# Patient Record
Sex: MALE | Race: BLACK OR AFRICAN AMERICAN | NOT HISPANIC OR LATINO | Employment: UNEMPLOYED | URBAN - METROPOLITAN AREA
[De-identification: names, ages, dates, MRNs, and addresses within clinical notes are randomized per-mention and may not be internally consistent; named-entity substitution may affect disease eponyms.]

---

## 2020-09-02 ENCOUNTER — HOSPITAL ENCOUNTER (EMERGENCY)
Facility: HOSPITAL | Age: 80
Discharge: LEFT BEFORE DISCHARGE | End: 2020-09-02
Attending: EMERGENCY MEDICINE

## 2020-09-02 VITALS
OXYGEN SATURATION: 99 % | TEMPERATURE: 98 F | DIASTOLIC BLOOD PRESSURE: 105 MMHG | HEIGHT: 65 IN | SYSTOLIC BLOOD PRESSURE: 225 MMHG | RESPIRATION RATE: 18 BRPM | HEART RATE: 90 BPM | BODY MASS INDEX: 26.66 KG/M2 | WEIGHT: 160 LBS

## 2020-09-02 DIAGNOSIS — M79.674 PAIN OF TOE OF RIGHT FOOT: ICD-10-CM

## 2020-09-02 DIAGNOSIS — R21 RASH: Primary | ICD-10-CM

## 2020-09-02 PROCEDURE — 99282 EMERGENCY DEPT VISIT SF MDM: CPT

## 2020-09-02 NOTE — ED NOTES
Pt presents to the ED with c/o rash to right leg. Pt states that he noticed the rash a week ago. Pt states that it was bumps at first that burst and he started putting Cortizone 10 on it. Pt states that he also has swelling to his foot after he hit his toe. Pt denies any allergies. Pt states that he does have a history of HTN but has not been taking medication for it since he moved to [place]. Pt states that his BP has been in control. Pt denies chest pain. Pt placed on cardiac monitor. NAD noted.

## 2020-09-02 NOTE — ED PROVIDER NOTES
Encounter Date: 9/2/2020    SCRIBE #1 NOTE: I, Fili Chatterjee, am scribing for, and in the presence of,  Iggy Gonzalez MD. I have scribed the following portions of the note - Other sections scribed: HPI, ROS, PE.       History     Chief Complaint   Patient presents with    Rash     Pt c/o rash to RLE after eating seafood a week ago, pt reports he has been using OTC cream without any relief. Pt also c/o pain and swelling to the great toe of the right foot x1 week. Pt denies any injury or trauma to toe. Resp even and unlabored.     Yefri Butler is a 80 y.o. male with a PMHx of HTN who presents to the ED with complaints of a rash on his right foot. States he has been eating lost of seafood recently, which he believes is the cause of his rash. Patient also endorses pain in left big toe after hitting toe on bed post. Upon exam, I asked him to take of the sock on his left foot, which he refused and decided to leave AMA.       The history is provided by the patient. No  was used.     Review of patient's allergies indicates:  No Known Allergies  Past Medical History:   Diagnosis Date    Hypertension      History reviewed. No pertinent surgical history.  History reviewed. No pertinent family history.  Social History     Tobacco Use    Smoking status: Never Smoker    Smokeless tobacco: Never Used   Substance Use Topics    Alcohol use: Yes     Frequency: Never    Drug use: Never     Review of Systems   Musculoskeletal:        Positive for pain in right great toe.    Skin: Positive for rash (Right foot).       Physical Exam     Initial Vitals [09/02/20 0655]   BP Pulse Resp Temp SpO2   (S) (!) 225/105 90 18 97.8 °F (36.6 °C) 99 %      MAP       --         Physical Exam    Constitutional: He appears well-developed and well-nourished. He is not diaphoretic. No distress.   HENT:   Head: Normocephalic and atraumatic.   Right Ear: External ear normal.   Left Ear: External ear normal.   Eyes: EOM are  normal. Pupils are equal, round, and reactive to light. Right eye exhibits no discharge. Left eye exhibits no discharge.   Neck: Normal range of motion.   Cardiovascular: Normal rate.   Pulmonary/Chest: No respiratory distress.   Abdominal: He exhibits no distension. There is no guarding.   Musculoskeletal: Normal range of motion. No edema.      Comments: Normal right foot exam.   Neurological: He is alert and oriented to person, place, and time. He has normal strength. GCS score is 15. GCS eye subscore is 4. GCS verbal subscore is 5. GCS motor subscore is 6.   Skin: Skin is warm and dry.   Psychiatric: He has a normal mood and affect. His behavior is normal.         ED Course   Procedures  Labs Reviewed - No data to display       Imaging Results    None          Medical Decision Making:   Initial Assessment:   80-year-old male presenting with right toe pain and rash.  History exam severely limited.  Upon entering room, the patient became irate.  I requested to look at his left foot in order to compare his area of reported pain.  The patient refused immediately asked to leave AMA.  He refused to discuss any other aspects of his health including hypertension.  He refused further exam or palpation.  The foot does not appear to be significantly disease.  There appears to be healing rash at the level of his ankle and shin.  No obvious deformity, swelling, cellulitis of the foot.  Patient ambulating without difficulty.  Patient elected to leave against medical advice.  I have no reason to suspect the patient does not have capability to understand the consequences of his actions. He did not appear intoxicated, deranged, or altered. Patient encouraged to return for any new or worsening condition.              Scribe Attestation:   Scribe #1: I performed the above scribed service and the documentation accurately describes the services I performed. I attest to the accuracy of the note.                          Clinical  Impression:       ICD-10-CM ICD-9-CM   1. Rash  R21 782.1   2. Pain of toe of right foot  M79.674 729.5         Disposition:   Disposition: Discharged  Condition: Stable     ED Disposition Condition    AMA            I, Iggy Gonzalez, personally performed the services described in this documentation. All medical record entries made by the scribe were at my direction and in my presence. I have reviewed the chart and agree that the record reflects my personal performance and is accurate and complete.                 Iggy Gonzalez MD  09/02/20 7930

## 2022-01-27 ENCOUNTER — HOSPITAL ENCOUNTER (EMERGENCY)
Facility: HOSPITAL | Age: 82
Discharge: LEFT BEFORE DISCHARGE | End: 2022-01-27
Attending: EMERGENCY MEDICINE

## 2022-01-27 VITALS
RESPIRATION RATE: 20 BRPM | HEIGHT: 66 IN | BODY MASS INDEX: 24.11 KG/M2 | HEART RATE: 67 BPM | TEMPERATURE: 99 F | WEIGHT: 150 LBS | SYSTOLIC BLOOD PRESSURE: 196 MMHG | OXYGEN SATURATION: 100 % | DIASTOLIC BLOOD PRESSURE: 88 MMHG

## 2022-01-27 DIAGNOSIS — N17.9 AKI (ACUTE KIDNEY INJURY): Primary | ICD-10-CM

## 2022-01-27 DIAGNOSIS — R79.89 ELEVATED BRAIN NATRIURETIC PEPTIDE (BNP) LEVEL: ICD-10-CM

## 2022-01-27 DIAGNOSIS — I16.1 HYPERTENSIVE EMERGENCY: ICD-10-CM

## 2022-01-27 DIAGNOSIS — I10 HYPERTENSION: ICD-10-CM

## 2022-01-27 LAB
ALBUMIN SERPL BCP-MCNC: 3.7 G/DL (ref 3.5–5.2)
ALP SERPL-CCNC: 136 U/L (ref 55–135)
ALT SERPL W/O P-5'-P-CCNC: 10 U/L (ref 10–44)
ANION GAP SERPL CALC-SCNC: 10 MMOL/L (ref 8–16)
AST SERPL-CCNC: 16 U/L (ref 10–40)
BASOPHILS # BLD AUTO: 0.02 K/UL (ref 0–0.2)
BASOPHILS NFR BLD: 0.5 % (ref 0–1.9)
BILIRUB SERPL-MCNC: 0.5 MG/DL (ref 0.1–1)
BNP SERPL-MCNC: 504 PG/ML (ref 0–99)
BUN SERPL-MCNC: 20 MG/DL (ref 8–23)
CALCIUM SERPL-MCNC: 9.1 MG/DL (ref 8.7–10.5)
CHLORIDE SERPL-SCNC: 103 MMOL/L (ref 95–110)
CO2 SERPL-SCNC: 29 MMOL/L (ref 23–29)
CREAT SERPL-MCNC: 1.9 MG/DL (ref 0.5–1.4)
DIFFERENTIAL METHOD: NORMAL
EOSINOPHIL # BLD AUTO: 0.1 K/UL (ref 0–0.5)
EOSINOPHIL NFR BLD: 1.4 % (ref 0–8)
ERYTHROCYTE [DISTWIDTH] IN BLOOD BY AUTOMATED COUNT: 12.9 % (ref 11.5–14.5)
EST. GFR  (AFRICAN AMERICAN): 37 ML/MIN/1.73 M^2
EST. GFR  (NON AFRICAN AMERICAN): 32 ML/MIN/1.73 M^2
GLUCOSE SERPL-MCNC: 93 MG/DL (ref 70–110)
HCT VFR BLD AUTO: 44.3 % (ref 40–54)
HGB BLD-MCNC: 14.7 G/DL (ref 14–18)
IMM GRANULOCYTES # BLD AUTO: 0 K/UL (ref 0–0.04)
IMM GRANULOCYTES NFR BLD AUTO: 0 % (ref 0–0.5)
LYMPHOCYTES # BLD AUTO: 1.6 K/UL (ref 1–4.8)
LYMPHOCYTES NFR BLD: 36.1 % (ref 18–48)
MAGNESIUM SERPL-MCNC: 2.2 MG/DL (ref 1.6–2.6)
MCH RBC QN AUTO: 30.3 PG (ref 27–31)
MCHC RBC AUTO-ENTMCNC: 33.2 G/DL (ref 32–36)
MCV RBC AUTO: 91 FL (ref 82–98)
MONOCYTES # BLD AUTO: 0.5 K/UL (ref 0.3–1)
MONOCYTES NFR BLD: 10.5 % (ref 4–15)
NEUTROPHILS # BLD AUTO: 2.3 K/UL (ref 1.8–7.7)
NEUTROPHILS NFR BLD: 51.5 % (ref 38–73)
NRBC BLD-RTO: 0 /100 WBC
PLATELET # BLD AUTO: 225 K/UL (ref 150–450)
PMV BLD AUTO: 9.7 FL (ref 9.2–12.9)
POCT GLUCOSE: 70 MG/DL (ref 70–110)
POTASSIUM SERPL-SCNC: 3.5 MMOL/L (ref 3.5–5.1)
PROT SERPL-MCNC: 7.6 G/DL (ref 6–8.4)
RBC # BLD AUTO: 4.85 M/UL (ref 4.6–6.2)
SODIUM SERPL-SCNC: 142 MMOL/L (ref 136–145)
TROPONIN I SERPL DL<=0.01 NG/ML-MCNC: 0.02 NG/ML (ref 0–0.03)
TSH SERPL DL<=0.005 MIU/L-ACNC: 1.6 UIU/ML (ref 0.4–4)
WBC # BLD AUTO: 4.38 K/UL (ref 3.9–12.7)

## 2022-01-27 PROCEDURE — 93010 ELECTROCARDIOGRAM REPORT: CPT | Mod: ,,, | Performed by: INTERNAL MEDICINE

## 2022-01-27 PROCEDURE — 85025 COMPLETE CBC W/AUTO DIFF WBC: CPT | Performed by: EMERGENCY MEDICINE

## 2022-01-27 PROCEDURE — 93010 EKG 12-LEAD: ICD-10-PCS | Mod: ,,, | Performed by: INTERNAL MEDICINE

## 2022-01-27 PROCEDURE — 84484 ASSAY OF TROPONIN QUANT: CPT | Performed by: EMERGENCY MEDICINE

## 2022-01-27 PROCEDURE — 99285 EMERGENCY DEPT VISIT HI MDM: CPT | Mod: 25

## 2022-01-27 PROCEDURE — 83880 ASSAY OF NATRIURETIC PEPTIDE: CPT | Performed by: EMERGENCY MEDICINE

## 2022-01-27 PROCEDURE — 63600175 PHARM REV CODE 636 W HCPCS: Performed by: EMERGENCY MEDICINE

## 2022-01-27 PROCEDURE — 80053 COMPREHEN METABOLIC PANEL: CPT | Performed by: EMERGENCY MEDICINE

## 2022-01-27 PROCEDURE — 82962 GLUCOSE BLOOD TEST: CPT

## 2022-01-27 PROCEDURE — 96374 THER/PROPH/DIAG INJ IV PUSH: CPT

## 2022-01-27 PROCEDURE — 83735 ASSAY OF MAGNESIUM: CPT | Performed by: EMERGENCY MEDICINE

## 2022-01-27 PROCEDURE — 25000003 PHARM REV CODE 250: Performed by: EMERGENCY MEDICINE

## 2022-01-27 PROCEDURE — 84443 ASSAY THYROID STIM HORMONE: CPT | Performed by: EMERGENCY MEDICINE

## 2022-01-27 PROCEDURE — 93005 ELECTROCARDIOGRAM TRACING: CPT

## 2022-01-27 RX ORDER — HYDRALAZINE HYDROCHLORIDE 20 MG/ML
10 INJECTION INTRAMUSCULAR; INTRAVENOUS
Status: COMPLETED | OUTPATIENT
Start: 2022-01-27 | End: 2022-01-27

## 2022-01-27 RX ORDER — AMLODIPINE BESYLATE 10 MG/1
10 TABLET ORAL DAILY
Qty: 30 TABLET | Refills: 0 | Status: SHIPPED | OUTPATIENT
Start: 2022-01-27 | End: 2023-02-20

## 2022-01-27 RX ORDER — AMLODIPINE BESYLATE 5 MG/1
10 TABLET ORAL
Status: COMPLETED | OUTPATIENT
Start: 2022-01-27 | End: 2022-01-27

## 2022-01-27 RX ADMIN — AMLODIPINE BESYLATE 10 MG: 5 TABLET ORAL at 04:01

## 2022-01-27 RX ADMIN — HYDRALAZINE HYDROCHLORIDE 10 MG: 20 INJECTION, SOLUTION INTRAMUSCULAR; INTRAVENOUS at 06:01

## 2022-01-27 NOTE — FIRST PROVIDER EVALUATION
Emergency Department TeleTriage Encounter Note      CHIEF COMPLAINT    Chief Complaint   Patient presents with    Hypertension     Patient presents to the ED via pov. Patient's friend of family reports that patient had a BP of 221/135. Patient reports a mild frontal headache. He denies numbness, tingling, dizziness, or weakness. Patient denies hx of hypertension. Patient's wife states that patient has been moving kind of slow and she cant understand what he's saying. Patient is oriented to person, place and time. Patient is speaking in complete sentences.        VITAL SIGNS   Initial Vitals [01/27/22 1429]   BP Pulse Resp Temp SpO2   (!) 198/102 (!) 42 16 98.5 °F (36.9 °C) 100 %      MAP       --            ALLERGIES    Review of patient's allergies indicates:  No Known Allergies    PROVIDER TRIAGE NOTE  Patient is a 81 year old male who presents with elevated blood pressure for unknown amount of time. Patient denied complaint. Wife reports weakness and decreased oral intake. No CP or SOB.     Initial orders will be placed and care will be transferred to an alternate provider when patient is roomed for a full evaluation. Any additional orders and the final disposition will be determined by that provider.        ORDERS  Labs Reviewed   CBC W/ AUTO DIFFERENTIAL   COMPREHENSIVE METABOLIC PANEL   TROPONIN I   B-TYPE NATRIURETIC PEPTIDE   MAGNESIUM   TSH   POCT GLUCOSE       ED Orders (720h ago, onward)    Start Ordered     Status Ordering Provider    01/27/22 1445 01/27/22 1437  amLODIPine tablet 10 mg  ED 1 Time         Ordered MYNORUMELISSA.    01/27/22 1438 01/27/22 1437  TSH  STAT         Ordered MELISSA GAMBOA.    01/27/22 1437 01/27/22 1437  CT Head Without Contrast  1 time imaging         Ordered MYNORUMELISSA.    01/27/22 1436 01/27/22 1437  Vital signs  Every 15 min         Ordered ZEVINUMELISSA Y.    01/27/22 1436 01/27/22 1437  Cardiac Monitoring - Adult  Continuous        Comments: Notify Physician  If:    Ordered MELISSA GAMBOA.    01/27/22 1436 01/27/22 1437  Pulse Oximetry Continuous  Continuous         Ordered MYNORUMELISSA.    01/27/22 1436 01/27/22 1437  Diet NPO  Diet effective now         Ordered MELISSA GAMBOA.    01/27/22 1436 01/27/22 1437  Saline lock IV  Once         Ordered MELISSA GAMBOA.    01/27/22 1436 01/27/22 1437  EKG 12-lead  Once        Comments: Do not perform if previously done during this visit/ triage    Ordered MELISSA GAMBOA.    01/27/22 1436 01/27/22 1437  CBC auto differential  STAT         Ordered MELISSA GAMBOA.    01/27/22 1436 01/27/22 1437  Comprehensive metabolic panel  STAT         Ordered MELISSA GAMBOA.    01/27/22 1436 01/27/22 1437  Troponin I #1  STAT         Ordered MELISSA GAMBOA.    01/27/22 1436 01/27/22 1437  B-Type natriuretic peptide (BNP)  STAT         Ordered MELISSA GAMBOA.    01/27/22 1436 01/27/22 1437  X-Ray Chest AP Portable  1 time imaging         Ordered MELISSA GAMBOA.    01/27/22 1436 01/27/22 1437  Magnesium  STAT         Ordered MELISSA GAMBOA.    01/27/22 1432 01/27/22 1432  POCT glucose  Once         Final result EMERGENCY, DEPT PHYSICIAN            Virtual Visit Note: The provider triage portion of this emergency department evaluation and documentation was performed via [employer], a HIPAA-compliant telemedicine application, in concert with a tele-presenter in the room. A face to face patient evaluation with one of my colleagues will occur once the patient is placed in an emergency department room.      DISCLAIMER: This note was prepared with [employer] voice recognition transcription software. Garbled syntax, mangled pronouns, and other bizarre constructions may be attributed to that software system.

## 2022-01-27 NOTE — ED TRIAGE NOTES
Pt. Complains of high blood pressure. Unknown how long his BP has been elevated. Pt. Denies any symptoms at this time. Pt's grandson states that on Sunday the pt. Was complaining of chest pain on the right side of his chest that was radiating to his right arm. Pt. Denies any pain at this time. Pt's wife states that the pt. Has a hx of HTN and was taking BP meds years ago, but she does not remember what he was taking. Pt's wife states that she took his BP today because the pt. Was moving slow and his appetite has decreased which started within this week.

## 2022-01-27 NOTE — ED PROVIDER NOTES
"Encounter Date: 1/27/2022    SCRIBE #1 NOTE: I, Abida Niya, am scribing for, and in the presence of, Angelique Purdy MD.       History     Chief Complaint   Patient presents with    Hypertension     Patient presents to the ED via pov. Patient's friend of family reports that patient had a BP of 221/135. Patient reports a mild frontal headache. He denies numbness, tingling, dizziness, or weakness. Patient denies hx of hypertension. Patient's wife states that patient has been moving kind of slow and she cant understand what he's saying. Patient is oriented to person, place and time. Patient is speaking in complete sentences.      Yefri Butler is a 81 y.o. male, with a PMHx of HTN who presents to the ED with asymptomatic hypertension (per patient). Per family, patient has been progressively experiencing fatigue and generalized malaise since 4 days ago. Today, patient was "walking slow and weak all over" which prompted family to measure his blood pressure, which was elevated at 221/135, prompting ED arrival today. Patient does report a remote Hx of HTN, and was on an unknown HTN medication "a long time ago", and per family he refuses to f/u with a PCP. Patient currently denies taking any daily medications. Patient denies any other complaints including appetite change, chest pain, shortness of breath, numbness, unilateral weakness, vision changes, dysuria, hematuria, nausea, vomiting, diarrhea, constipation. Patient admits to EtOH consumption (4-5 days per week, usually "a couple of beers"), but denies cigarette or recreational drug use. No other exacerbating or alleviating factors. No other associated symptoms. No prior surgeries. NKDA.     The history is provided by the patient and a relative.     Review of patient's allergies indicates:  No Known Allergies  Past Medical History:   Diagnosis Date    Hypertension      History reviewed. No pertinent surgical history.  History reviewed. No pertinent family " history.  Social History     Tobacco Use    Smoking status: Never Smoker    Smokeless tobacco: Never Used   Substance Use Topics    Alcohol use: Yes    Drug use: Never     Review of Systems   Constitutional: Positive for fatigue. Negative for appetite change, chills, diaphoresis and fever.   HENT: Negative for drooling, sore throat and voice change.    Eyes: Negative for photophobia and visual disturbance.   Respiratory: Negative for cough, shortness of breath and wheezing.    Cardiovascular: Negative for chest pain and leg swelling.        + elevated blood pressure.    Gastrointestinal: Negative for abdominal pain, blood in stool, constipation, diarrhea, nausea and vomiting.   Endocrine: Negative for polyphagia.   Genitourinary: Negative for dysuria, frequency, hematuria and urgency.   Musculoskeletal: Negative for myalgias, neck pain and neck stiffness.   Skin: Negative for rash and wound.   Neurological: Positive for weakness (generalized). Negative for syncope, light-headedness, numbness and headaches.   Hematological: Does not bruise/bleed easily.   Psychiatric/Behavioral: Negative for agitation, confusion and suicidal ideas.   All other systems reviewed and are negative.      Physical Exam     Initial Vitals [01/27/22 1429]   BP Pulse Resp Temp SpO2   (!) 198/102 (!) 42 16 98.5 °F (36.9 °C) 100 %      MAP       --         Physical Exam    Nursing note and vitals reviewed.  Constitutional: He appears well-developed and well-nourished. He is not diaphoretic. No distress.   HENT:   Head: Normocephalic and atraumatic.   Right Ear: External ear normal.   Left Ear: External ear normal.   Mouth/Throat: Oropharynx is clear and moist. No oropharyngeal exudate.   Eyes: Conjunctivae and EOM are normal. Pupils are equal, round, and reactive to light. Right eye exhibits no discharge. Left eye exhibits no discharge.   Neck: Neck supple. No JVD present.   Normal range of motion.  Cardiovascular: Normal rate, regular  rhythm, normal heart sounds and intact distal pulses. Exam reveals no gallop and no friction rub.    No murmur heard.  Abnormal heart sound, click vs loud S4.    Pulmonary/Chest: Breath sounds normal. No respiratory distress. He has no wheezes. He has no rhonchi. He has no rales.   Abdominal: Abdomen is soft. Bowel sounds are normal. He exhibits no distension. There is no abdominal tenderness. There is no rebound and no guarding.   Musculoskeletal:         General: No tenderness or edema. Normal range of motion.      Cervical back: Normal range of motion and neck supple.     Lymphadenopathy:     He has no cervical adenopathy.   Neurological: He is alert and oriented to person, place, and time. He has normal strength. No cranial nerve deficit or sensory deficit. GCS score is 15. GCS eye subscore is 4. GCS verbal subscore is 5. GCS motor subscore is 6.   Moves all extremities and carries on conversation. CN- II: PERRL; III/IV/VI: EOMI w/out evidence of nystagmus; V: no deficits appreciated to light touch bilateral face; VII: no facial weakness, no facial asymmetry. Eyebrow raise symmetric. Smile symmetric; IX/X: palate midline, and raises symmetrically; XI: shoulder shrug 5/5 bilaterally; XII: tongue is midline w/out asymmetry. Strength 5/5 to bilateral upper and lower extremities, sensation intact to light touch,   Skin: Skin is warm and dry. Capillary refill takes less than 2 seconds.   Psychiatric: He has a normal mood and affect. Thought content normal.         ED Course   Procedures  Labs Reviewed   COMPREHENSIVE METABOLIC PANEL - Abnormal; Notable for the following components:       Result Value    Creatinine 1.9 (*)     Alkaline Phosphatase 136 (*)     eGFR if  37 (*)     eGFR if non  32 (*)     All other components within normal limits   B-TYPE NATRIURETIC PEPTIDE - Abnormal; Notable for the following components:     (*)     All other components within normal limits   CBC  W/ AUTO DIFFERENTIAL   TROPONIN I   MAGNESIUM   TSH   POCT GLUCOSE     EKG Readings: (Independently Interpreted)   Sinus rhythm at 72 with first-degree AV block and PVCs and PACs.  T-wave inversion and slight depression in V4 V5 V6.  No ST elevation.  Normal axis.  QTC is 459. Do not have a previous EKG to compare this to.         Imaging Results          X-Ray Chest AP Portable (Final result)  Result time 01/27/22 15:16:58    Final result by Hernandez Yun MD (01/27/22 15:16:58)                 Impression:      No acute abnormality.      Electronically signed by: Hernandez Yun  Date:    01/27/2022  Time:    15:16             Narrative:    EXAMINATION:  XR CHEST AP PORTABLE    CLINICAL HISTORY:  hypertension;    TECHNIQUE:  Single frontal view of the chest was performed.    COMPARISON:  None    FINDINGS:  The lungs are clear, with normal appearance of pulmonary vasculature and no pleural effusion or pneumothorax.    The cardiac silhouette is normal in size. The hilar and mediastinal contours are unremarkable.    Bones are intact.                               CT Head Without Contrast (Final result)  Result time 01/27/22 15:14:15    Final result by Anthony Nguyen MD (01/27/22 15:14:15)                 Impression:      No acute large vascular territory infarct or intracranial hemorrhage identified.  Further evaluation/follow-up as warranted.    Involutional change and suspected moderate to advanced chronic microvascular ischemic change.    Suspected multifocal age-indeterminate lacunar type infarcts as above.  Correlate clinically.      Electronically signed by: Anthony Nguyen MD  Date:    01/27/2022  Time:    15:14             Narrative:    EXAMINATION:  CT HEAD WITHOUT CONTRAST    CLINICAL HISTORY:  Mental status change, unknown cause;    TECHNIQUE:  Low dose axial CT images obtained throughout the head without intravenous contrast. Sagittal and coronal reconstructions were  performed.    COMPARISON:  None.    FINDINGS:  Intracranial compartment: Brain appears normally formed.    Age-related generalized cerebral volume loss.  Ventricles are midline without distortion by mass effect or acute hydrocephalus noting cavum septum pellucidum.  No extra-axial blood or fluid collections.    Patchy hypoattenuation of the subcortical and periventricular white matter likely sequela of chronic microvascular ischemic change, overall moderate to advanced.  More focal areas of hypoattenuation at the genu of the left internal capsule, right basal ganglia/corona radiata, right thalamus and paramedian upper right amira suggestive of lacunar type infarcts, overall age-indeterminate without priors.  No parenchymal mass, hemorrhage, edema or major vascular distribution infarct.  Skull base atherosclerotic vascular calcifications noted.    Skull/extracranial contents (limited evaluation): No fracture. Mastoid air cells and paranasal sinuses are essentially clear.  Imaged portions of the orbits are within normal limits.                                 Medications   amLODIPine tablet 10 mg (10 mg Oral Given 1/27/22 1642)   hydrALAZINE injection 10 mg (10 mg Intravenous Given 1/27/22 1848)     Medical Decision Making:   History:   Old Medical Records: I decided to obtain old medical records.  Old Records Summarized: other records.       <> Summary of Records: Patient has been prescribed Lisinopril-Hydrochlorothiazide and Amlodipine in the past in 6/2014. Patient had an ED visit on 9/2020 where his blood pressure was elevated at 225/105.  Initial Assessment:   This is an emergent evaluation of a 81 y.o. male who presents with symptomatic hypertension. The patient was seen and examined. The history and physical exam was obtained. The nursing notes and vital signs were reviewed. Secondary to symptoms and examination findings, I ordered labs, CXR, CT head, EKG. Will treat with amlodipine and reassess.   Independently  "Interpreted Test(s):   I have ordered and independently interpreted EKG Reading(s) - see prior notes  Clinical Tests:   Lab Tests: Ordered and Reviewed  Radiological Study: Ordered and Reviewed  Medical Tests: Ordered and Reviewed  MDM  80 yo male with PMhx of HTN presents with HTN and increased fatigue per family. Patient denies complaints. BP very elevated. End organ damage screening labs with crea of 1.9 (unknown baseline), elevated BNP, and lateral ischemia and EKG. Given amlodipine and hydralazine with improvement of BP to 196/88. Patient adamently refusing admission for HTN emergency. Understands risks of death, renal failure, heart failure, stroke. Is willing to get Rx for amlodipine and see PCP in 1-3 days for recheck of BP and medication management. Wife states will check patient's blood pressure at home. Will sign out patient AMA.    AMA DOCUMENTATION    I have discussed with the patient what changes in care would have to occur for my patient to agree with the current, recommended treatment plan. The reason the patient offers for leaving/choosing against medical advice is: "I just don't want to be the the hospital."     My patient displays normal decision making capacity; alert and oriented to person place and situation, not altered or under the influence. I explained to the patient the nature of their illness, injury, or disease and the need and advisability of continued in-hospital evaluation and treatment together with the known risks of discontinuing medical care at this time. I explained the risks of worsening condition, permanent disability, death in layman's terms to the patient. The patient is aware of the risks and benefits of the current treatment plan and the alternative therapies offered.     Furthermore, I offered the patient alternatives to the preferred evaluation and treatment plan including PO medicine, PCP follow up, returning to the ER, and watchful waiting. The risks and benefits of " these alternatives were discussed. Those alternative therapies that my patient agrees to are: PO medications, PCP follow up.     The patient is encouraged to return to the Emergency Department at any time to receive care and resume treatment. Present for this conversation were: Wife, grandson, RN    I will discharge the patient against medical advice.    Angelique Purdy MD  11:57 PM 01/27/2022          Scribe Attestation:   Scribe #1: I performed the above scribed service and the documentation accurately describes the services I performed. I attest to the accuracy of the note.                 Clinical Impression:   Final diagnoses:  [I10] Hypertension  [N17.9] BISMARK (acute kidney injury) (Primary)  [R79.89] Elevated brain natriuretic peptide (BNP) level  [I16.1] Hypertensive emergency          ED Disposition Condition    AMA        I, Angelique Purdy, personally performed the services described in this documentation. All medical record entries made by the scribe were at my direction and in my presence. I have reviewed the chart and agree that the record reflects my personal performance and is accurate and complete.          Angelique Purdy MD  01/27/22 7239

## 2022-01-28 NOTE — DISCHARGE INSTRUCTIONS
Take your blood pressure at home twice a day for 3 days and write these numbers down, bring with you to your primary care doctor in 2-3 days for blood pressure recheck as you may need your medications changed. Return to the ED for chest pain, shortness of breath, numbness, weakness, loss of vision or any other concerns.     Please return to the ED at any time if you change your mind and would like to be admitted.    Thank you for coming to our Emergency Department today. As we discussed, it is important to remember that some problems are difficult to diagnose and may not be found during your Emergency Department visit. Be sure to follow up with your primary care doctor and review all labs/imaging/tests that were performed during this visit with them. Some labs/tests may be outside of the normal range and require non-emergent follow-up and further investigation to help diagnose/exclude/prevent complications or other medical conditions.    If you do not have a primary care doctor, you may contact the one listed on your discharge paperwork or you may also call the [facility] Appointment Desk at [phone] to schedule an appointment and establish care with one. It is important to your health that you have a primary care doctor.    Please take all medications as directed. All medications may potentially have side-effects and it is impossible to predict which medications may give you side-effects or what side-effects (if any) they will give you.. If you feel that you are having a negative effect or side-effect of any medication you should immediately stop taking them and seek medical attention. If you feel that you are having a life-threatening reaction call 911.    Return to the ER with any questions/concerns, new/concerning symptoms, worsening or failure to improve.     Do not drive, swim, climb to height, take a bath or make any important decisions for 24 hours if you have received any pain medications,  sedatives or mood altering drugs during your ER visit.

## 2023-02-20 ENCOUNTER — HOSPITAL ENCOUNTER (OUTPATIENT)
Facility: HOSPITAL | Age: 83
Discharge: HOME | End: 2023-02-22
Attending: EMERGENCY MEDICINE | Admitting: STUDENT IN AN ORGANIZED HEALTH CARE EDUCATION/TRAINING PROGRAM

## 2023-02-20 DIAGNOSIS — R07.9 CHEST PAIN: ICD-10-CM

## 2023-02-20 DIAGNOSIS — N18.31 STAGE 3A CHRONIC KIDNEY DISEASE: Primary | ICD-10-CM

## 2023-02-20 DIAGNOSIS — I48.92 ATRIAL FLUTTER: ICD-10-CM

## 2023-02-20 DIAGNOSIS — R79.89 ELEVATED TROPONIN: ICD-10-CM

## 2023-02-20 DIAGNOSIS — I48.91 ATRIAL FIBRILLATION WITH RVR: ICD-10-CM

## 2023-02-20 PROBLEM — I10 PRIMARY HYPERTENSION: Status: ACTIVE | Noted: 2023-02-20

## 2023-02-20 LAB
ALBUMIN SERPL BCP-MCNC: 3.7 G/DL (ref 3.5–5.2)
ALP SERPL-CCNC: 147 U/L (ref 55–135)
ALT SERPL W/O P-5'-P-CCNC: 9 U/L (ref 10–44)
ANION GAP SERPL CALC-SCNC: 13 MMOL/L (ref 8–16)
AORTIC ROOT ANNULUS: 3.43 CM
AORTIC VALVE CUSP SEPERATION: 1.96 CM
ASCENDING AORTA: 3.09 CM
AST SERPL-CCNC: 18 U/L (ref 10–40)
AV INDEX (PROSTH): 1.38
AV MEAN GRADIENT: 2 MMHG
AV PEAK GRADIENT: 3 MMHG
AV REGURGITATION PRESSURE HALF TIME: 822.43 MS
AV VALVE AREA: 4.23 CM2
AV VELOCITY RATIO: 1.02
BASOPHILS # BLD AUTO: 0.02 K/UL (ref 0–0.2)
BASOPHILS NFR BLD: 0.3 % (ref 0–1.9)
BILIRUB SERPL-MCNC: 0.5 MG/DL (ref 0.1–1)
BILIRUB UR QL STRIP: NEGATIVE
BNP SERPL-MCNC: 396 PG/ML (ref 0–99)
BSA FOR ECHO PROCEDURE: 1.75 M2
BUN SERPL-MCNC: 26 MG/DL (ref 8–23)
CALCIUM SERPL-MCNC: 9.5 MG/DL (ref 8.7–10.5)
CHLORIDE SERPL-SCNC: 105 MMOL/L (ref 95–110)
CHOLEST SERPL-MCNC: 285 MG/DL (ref 120–199)
CHOLEST/HDLC SERPL: 4.7 {RATIO} (ref 2–5)
CLARITY UR: CLEAR
CO2 SERPL-SCNC: 23 MMOL/L (ref 23–29)
COLOR UR: YELLOW
CREAT SERPL-MCNC: 1.9 MG/DL (ref 0.5–1.4)
CTP QC/QA: YES
CV ECHO LV RWT: 0.5 CM
DIFFERENTIAL METHOD: NORMAL
DOP CALC AO PEAK VEL: 0.91 M/S
DOP CALC AO VTI: 15.2 CM
DOP CALC LVOT AREA: 3.1 CM2
DOP CALC LVOT DIAMETER: 1.98 CM
DOP CALC LVOT PEAK VEL: 0.93 M/S
DOP CALC LVOT STROKE VOLUME: 64.32 CM3
DOP CALCLVOT PEAK VEL VTI: 20.9 CM
E WAVE DECELERATION TIME: 242.96 MSEC
E/A RATIO: 1.27
E/E' RATIO: 11.82 M/S
ECHO LV POSTERIOR WALL: 1.11 CM (ref 0.6–1.1)
EJECTION FRACTION: 65 %
EOSINOPHIL # BLD AUTO: 0 K/UL (ref 0–0.5)
EOSINOPHIL NFR BLD: 0.4 % (ref 0–8)
ERYTHROCYTE [DISTWIDTH] IN BLOOD BY AUTOMATED COUNT: 13.2 % (ref 11.5–14.5)
EST. GFR  (NO RACE VARIABLE): 34 ML/MIN/1.73 M^2
FRACTIONAL SHORTENING: 35 % (ref 28–44)
GLUCOSE SERPL-MCNC: 101 MG/DL (ref 70–110)
GLUCOSE UR QL STRIP: NEGATIVE
HCT VFR BLD AUTO: 43.4 % (ref 40–54)
HDLC SERPL-MCNC: 61 MG/DL (ref 40–75)
HDLC SERPL: 21.4 % (ref 20–50)
HGB BLD-MCNC: 14.6 G/DL (ref 14–18)
HGB UR QL STRIP: NEGATIVE
IMM GRANULOCYTES # BLD AUTO: 0.02 K/UL (ref 0–0.04)
IMM GRANULOCYTES NFR BLD AUTO: 0.3 % (ref 0–0.5)
INTERVENTRICULAR SEPTUM: 1.56 CM (ref 0.6–1.1)
IVC DIAMETER: 11 CM
KETONES UR QL STRIP: ABNORMAL
LA MAJOR: 4.43 CM
LA MINOR: 6.09 CM
LDLC SERPL CALC-MCNC: 205.6 MG/DL (ref 63–159)
LEFT ATRIUM SIZE: 3.35 CM
LEFT INTERNAL DIMENSION IN SYSTOLE: 2.89 CM (ref 2.1–4)
LEFT VENTRICLE DIASTOLIC VOLUME INDEX: 51.31 ML/M2
LEFT VENTRICLE DIASTOLIC VOLUME: 89.28 ML
LEFT VENTRICLE MASS INDEX: 130 G/M2
LEFT VENTRICLE SYSTOLIC VOLUME INDEX: 18.4 ML/M2
LEFT VENTRICLE SYSTOLIC VOLUME: 31.97 ML
LEFT VENTRICULAR INTERNAL DIMENSION IN DIASTOLE: 4.43 CM (ref 3.5–6)
LEFT VENTRICULAR MASS: 226.05 G
LEUKOCYTE ESTERASE UR QL STRIP: NEGATIVE
LV LATERAL E/E' RATIO: 9.29 M/S
LV SEPTAL E/E' RATIO: 16.25 M/S
LVOT MG: 2.1 MMHG
LVOT MV: 0.7 CM/S
LYMPHOCYTES # BLD AUTO: 1.4 K/UL (ref 1–4.8)
LYMPHOCYTES NFR BLD: 20.9 % (ref 18–48)
MCH RBC QN AUTO: 29.6 PG (ref 27–31)
MCHC RBC AUTO-ENTMCNC: 33.6 G/DL (ref 32–36)
MCV RBC AUTO: 88 FL (ref 82–98)
MONOCYTES # BLD AUTO: 0.5 K/UL (ref 0.3–1)
MONOCYTES NFR BLD: 7.3 % (ref 4–15)
MV PEAK A VEL: 0.51 M/S
MV PEAK E VEL: 0.65 M/S
MV STENOSIS PRESSURE HALF TIME: 52.62 MS
MV VALVE AREA P 1/2 METHOD: 4.18 CM2
NEUTROPHILS # BLD AUTO: 4.8 K/UL (ref 1.8–7.7)
NEUTROPHILS NFR BLD: 70.8 % (ref 38–73)
NITRITE UR QL STRIP: NEGATIVE
NONHDLC SERPL-MCNC: 224 MG/DL
NRBC BLD-RTO: 0 /100 WBC
PH UR STRIP: 6 [PH] (ref 5–8)
PISA AR MAX VEL: 3.14 M/S
PISA TR MAX VEL: 1.39 M/S
PLATELET # BLD AUTO: 263 K/UL (ref 150–450)
PMV BLD AUTO: 9.5 FL (ref 9.2–12.9)
POTASSIUM SERPL-SCNC: 3.8 MMOL/L (ref 3.5–5.1)
PROT SERPL-MCNC: 7.8 G/DL (ref 6–8.4)
PROT UR QL STRIP: ABNORMAL
PV PEAK VELOCITY: 0.92 CM/S
RA MAJOR: 5.28 CM
RA PRESSURE: 3 MMHG
RBC # BLD AUTO: 4.93 M/UL (ref 4.6–6.2)
RIGHT VENTRICULAR END-DIASTOLIC DIMENSION: 3.21 CM
SARS-COV-2 RDRP RESP QL NAA+PROBE: NEGATIVE
SINUS: 3.47 CM
SODIUM SERPL-SCNC: 141 MMOL/L (ref 136–145)
SP GR UR STRIP: 1.02 (ref 1–1.03)
STJ: 2.89 CM
TDI LATERAL: 0.07 M/S
TDI SEPTAL: 0.04 M/S
TDI: 0.06 M/S
TR MAX PG: 8 MMHG
TRICUSPID ANNULAR PLANE SYSTOLIC EXCURSION: 1.78 CM
TRIGL SERPL-MCNC: 92 MG/DL (ref 30–150)
TROPONIN I SERPL DL<=0.01 NG/ML-MCNC: 0.05 NG/ML (ref 0–0.03)
TROPONIN I SERPL DL<=0.01 NG/ML-MCNC: 0.17 NG/ML (ref 0–0.03)
TROPONIN I SERPL DL<=0.01 NG/ML-MCNC: 0.32 NG/ML (ref 0–0.03)
TROPONIN I SERPL DL<=0.01 NG/ML-MCNC: 0.44 NG/ML (ref 0–0.03)
TSH SERPL DL<=0.005 MIU/L-ACNC: 2.07 UIU/ML (ref 0.4–4)
TV REST PULMONARY ARTERY PRESSURE: 11 MMHG
URN SPEC COLLECT METH UR: ABNORMAL
UROBILINOGEN UR STRIP-ACNC: NEGATIVE EU/DL
WBC # BLD AUTO: 6.84 K/UL (ref 3.9–12.7)

## 2023-02-20 PROCEDURE — 63600175 PHARM REV CODE 636 W HCPCS: Performed by: EMERGENCY MEDICINE

## 2023-02-20 PROCEDURE — 25000003 PHARM REV CODE 250: Performed by: NURSE PRACTITIONER

## 2023-02-20 PROCEDURE — 93010 EKG 12-LEAD: ICD-10-PCS | Mod: ,,, | Performed by: INTERNAL MEDICINE

## 2023-02-20 PROCEDURE — 99291 CRITICAL CARE FIRST HOUR: CPT | Mod: 25

## 2023-02-20 PROCEDURE — 84484 ASSAY OF TROPONIN QUANT: CPT | Performed by: EMERGENCY MEDICINE

## 2023-02-20 PROCEDURE — 96375 TX/PRO/DX INJ NEW DRUG ADDON: CPT | Mod: 59

## 2023-02-20 PROCEDURE — 93005 ELECTROCARDIOGRAM TRACING: CPT

## 2023-02-20 PROCEDURE — G0378 HOSPITAL OBSERVATION PER HR: HCPCS

## 2023-02-20 PROCEDURE — 96374 THER/PROPH/DIAG INJ IV PUSH: CPT | Mod: 59

## 2023-02-20 PROCEDURE — 80061 LIPID PANEL: CPT | Performed by: EMERGENCY MEDICINE

## 2023-02-20 PROCEDURE — 96376 TX/PRO/DX INJ SAME DRUG ADON: CPT

## 2023-02-20 PROCEDURE — 80053 COMPREHEN METABOLIC PANEL: CPT | Performed by: EMERGENCY MEDICINE

## 2023-02-20 PROCEDURE — 99204 OFFICE O/P NEW MOD 45 MIN: CPT | Mod: ,,, | Performed by: INTERNAL MEDICINE

## 2023-02-20 PROCEDURE — 84484 ASSAY OF TROPONIN QUANT: CPT | Mod: 91 | Performed by: NURSE PRACTITIONER

## 2023-02-20 PROCEDURE — 96372 THER/PROPH/DIAG INJ SC/IM: CPT | Mod: 59 | Performed by: EMERGENCY MEDICINE

## 2023-02-20 PROCEDURE — 63600175 PHARM REV CODE 636 W HCPCS: Performed by: NURSE PRACTITIONER

## 2023-02-20 PROCEDURE — 93010 ELECTROCARDIOGRAM REPORT: CPT | Mod: ,,, | Performed by: INTERNAL MEDICINE

## 2023-02-20 PROCEDURE — 25000003 PHARM REV CODE 250: Performed by: EMERGENCY MEDICINE

## 2023-02-20 PROCEDURE — 99204 PR OFFICE/OUTPT VISIT, NEW, LEVL IV, 45-59 MIN: ICD-10-PCS | Mod: ,,, | Performed by: INTERNAL MEDICINE

## 2023-02-20 PROCEDURE — 81003 URINALYSIS AUTO W/O SCOPE: CPT | Performed by: EMERGENCY MEDICINE

## 2023-02-20 PROCEDURE — 85025 COMPLETE CBC W/AUTO DIFF WBC: CPT | Performed by: EMERGENCY MEDICINE

## 2023-02-20 PROCEDURE — 83880 ASSAY OF NATRIURETIC PEPTIDE: CPT | Performed by: EMERGENCY MEDICINE

## 2023-02-20 PROCEDURE — 84443 ASSAY THYROID STIM HORMONE: CPT | Performed by: EMERGENCY MEDICINE

## 2023-02-20 PROCEDURE — 25000003 PHARM REV CODE 250: Performed by: INTERNAL MEDICINE

## 2023-02-20 RX ORDER — HYDRALAZINE HYDROCHLORIDE 20 MG/ML
10 INJECTION INTRAMUSCULAR; INTRAVENOUS EVERY 8 HOURS PRN
Status: DISCONTINUED | OUTPATIENT
Start: 2023-02-20 | End: 2023-02-22 | Stop reason: HOSPADM

## 2023-02-20 RX ORDER — DILTIAZEM HYDROCHLORIDE 5 MG/ML
0.25 INJECTION INTRAVENOUS
Status: COMPLETED | OUTPATIENT
Start: 2023-02-20 | End: 2023-02-20

## 2023-02-20 RX ORDER — ATORVASTATIN CALCIUM 40 MG/1
40 TABLET, FILM COATED ORAL DAILY
Status: DISCONTINUED | OUTPATIENT
Start: 2023-02-20 | End: 2023-02-22 | Stop reason: HOSPADM

## 2023-02-20 RX ORDER — AMLODIPINE BESYLATE 5 MG/1
10 TABLET ORAL DAILY
Status: DISCONTINUED | OUTPATIENT
Start: 2023-02-20 | End: 2023-02-22 | Stop reason: HOSPADM

## 2023-02-20 RX ORDER — METOPROLOL SUCCINATE 50 MG/1
50 TABLET, EXTENDED RELEASE ORAL DAILY
Qty: 30 TABLET | Refills: 3 | Status: SHIPPED | OUTPATIENT
Start: 2023-02-21 | End: 2024-02-21

## 2023-02-20 RX ORDER — DILTIAZEM HCL 1 MG/ML
0-15 INJECTION, SOLUTION INTRAVENOUS
Status: DISCONTINUED | OUTPATIENT
Start: 2023-02-20 | End: 2023-02-20

## 2023-02-20 RX ORDER — HYDRALAZINE HYDROCHLORIDE 25 MG/1
25 TABLET, FILM COATED ORAL 2 TIMES DAILY
Qty: 60 TABLET | Refills: 3 | Status: SHIPPED | OUTPATIENT
Start: 2023-02-20 | End: 2023-02-22 | Stop reason: HOSPADM

## 2023-02-20 RX ORDER — AMLODIPINE BESYLATE 5 MG/1
10 TABLET ORAL DAILY
Status: DISCONTINUED | OUTPATIENT
Start: 2023-02-20 | End: 2023-02-20

## 2023-02-20 RX ORDER — AMLODIPINE BESYLATE 10 MG/1
10 TABLET ORAL DAILY
Qty: 30 TABLET | Refills: 0 | Status: SHIPPED | OUTPATIENT
Start: 2023-02-20 | End: 2023-02-20 | Stop reason: SDUPTHER

## 2023-02-20 RX ORDER — HYDRALAZINE HYDROCHLORIDE 25 MG/1
25 TABLET, FILM COATED ORAL 2 TIMES DAILY
Status: DISCONTINUED | OUTPATIENT
Start: 2023-02-20 | End: 2023-02-21

## 2023-02-20 RX ORDER — ENOXAPARIN SODIUM 100 MG/ML
1 INJECTION SUBCUTANEOUS ONCE
Status: COMPLETED | OUTPATIENT
Start: 2023-02-20 | End: 2023-02-20

## 2023-02-20 RX ORDER — AMLODIPINE BESYLATE 10 MG/1
10 TABLET ORAL DAILY
Qty: 30 TABLET | Refills: 0 | Status: SHIPPED | OUTPATIENT
Start: 2023-02-20 | End: 2023-02-20 | Stop reason: CLARIF

## 2023-02-20 RX ORDER — METOPROLOL SUCCINATE 50 MG/1
50 TABLET, EXTENDED RELEASE ORAL DAILY
Status: DISCONTINUED | OUTPATIENT
Start: 2023-02-20 | End: 2023-02-22 | Stop reason: HOSPADM

## 2023-02-20 RX ADMIN — ATORVASTATIN CALCIUM 40 MG: 40 TABLET, FILM COATED ORAL at 10:02

## 2023-02-20 RX ADMIN — DILTIAZEM HYDROCHLORIDE 16.5 MG: 5 INJECTION INTRAVENOUS at 09:02

## 2023-02-20 RX ADMIN — NITROGLYCERIN 1 INCH: 20 OINTMENT TOPICAL at 09:02

## 2023-02-20 RX ADMIN — DILTIAZEM HYDROCHLORIDE 16.5 MG: 5 INJECTION INTRAVENOUS at 10:02

## 2023-02-20 RX ADMIN — METOPROLOL SUCCINATE 50 MG: 50 TABLET, EXTENDED RELEASE ORAL at 12:02

## 2023-02-20 RX ADMIN — HYDRALAZINE HYDROCHLORIDE 25 MG: 25 TABLET, FILM COATED ORAL at 10:02

## 2023-02-20 RX ADMIN — HYDRALAZINE HYDROCHLORIDE 10 MG: 20 INJECTION INTRAMUSCULAR; INTRAVENOUS at 02:02

## 2023-02-20 RX ADMIN — ENOXAPARIN SODIUM 70 MG: 40 INJECTION SUBCUTANEOUS at 12:02

## 2023-02-20 RX ADMIN — APIXABAN 2.5 MG: 2.5 TABLET, FILM COATED ORAL at 10:02

## 2023-02-20 RX ADMIN — AMLODIPINE BESYLATE 10 MG: 5 TABLET ORAL at 12:02

## 2023-02-20 NOTE — H&P
[facility] Medicine  History & Physical    Patient Name: Yefri Butler  MRN: [id number]  Patient Class: Emergency  Admission Date: 2/20/2023  Attending Physician: Eugenio Koch MD   Primary Care Provider: Primary Doctor No         Patient information was obtained from patient and ER records.     Subjective:     Principal Problem:<principal problem not specified>    Chief Complaint:   Chief Complaint   Patient presents with    Chest Pain     Pt bib [facility] c/o chest pain x 1 month worse this AM at 6. New onset of afib with RVR.         HPI: Yefri Butler is a 81 yo male with history for hypertension no longer taking amlodipine who presents to hospital for left chest wall discomfort associated with palpitation and shortness of breath that woke patient up this am. Denies headaches, dizziness, change in vision, c diaphoresis, orthopnea, PND, abdominal pain, nausea, vomiting, or extremities weakness/numbness. No new physical limitation in the past month. Prior to event, in usual state of health.  No history of MI or TIA/stroke. Never smoker. EKG a fib RVR then converted to NSR diltiazem IV. Nitro placed on chest as well. Troponin 0.046.   CXR similar to last year 1/2022.       Past Medical History:   Diagnosis Date    Hypertension        History reviewed. No pertinent surgical history.    Review of patient's allergies indicates:  No Known Allergies    No current facility-administered medications on file prior to encounter.     Current Outpatient Medications on File Prior to Encounter   Medication Sig    amLODIPine (NORVASC) 10 MG tablet Take 1 tablet (10 mg total) by mouth once daily.     Family History    None       Tobacco Use    Smoking status: Never    Smokeless tobacco: Never   Substance and Sexual Activity    Alcohol use: Yes     Comment: occasional    Drug use: Never    Sexual activity: Not on file     Review of Systems   Constitutional:  Positive for activity change. Negative  for appetite change, chills and fatigue.   HENT: Negative.     Eyes: Negative.    Respiratory:  Positive for chest tightness and shortness of breath. Negative for wheezing.    Cardiovascular:  Positive for chest pain and palpitations. Negative for leg swelling.   Gastrointestinal:  Positive for abdominal pain. Negative for blood in stool, constipation, diarrhea, nausea and vomiting.   Endocrine: Negative.    Genitourinary: Negative.    Musculoskeletal:  Positive for arthralgias, back pain and myalgias.   Skin: Negative.    Neurological: Negative.    Hematological: Negative.    Psychiatric/Behavioral: Negative.     Objective:     Vital Signs (Most Recent):  Temp: 97.5 °F (36.4 °C) (02/20/23 0904)  Pulse: 92 (02/20/23 1033)  Resp: 16 (02/20/23 1033)  BP: (!) 142/67 (02/20/23 1033)  SpO2: 100 % (02/20/23 1033)   Vital Signs (24h Range):  Temp:  [97.5 °F (36.4 °C)] 97.5 °F (36.4 °C)  Pulse:  [] 92  Resp:  [16-22] 16  SpO2:  [99 %-100 %] 100 %  BP: (142-197)/() 142/67     Weight: 65.8 kg (145 lb)  Body mass index is 23.4 kg/m².    Physical Exam  Constitutional:       Appearance: Normal appearance. He is not ill-appearing.   HENT:      Head: Normocephalic and atraumatic.   Eyes:      Pupils: Pupils are equal, round, and reactive to light.   Cardiovascular:      Rate and Rhythm: Normal rate and regular rhythm.   Pulmonary:      Effort: Pulmonary effort is normal.      Breath sounds: Normal breath sounds.   Abdominal:      General: Abdomen is flat. There is no distension.      Palpations: Abdomen is soft.   Musculoskeletal:         General: Normal range of motion.      Cervical back: Normal range of motion.   Skin:     General: Skin is warm and dry.   Neurological:      General: No focal deficit present.      Mental Status: He is alert and oriented to person, place, and time. Mental status is at baseline.      Comments: Wife states very forgetful    Psychiatric:         Mood and Affect: Mood normal.          CRANIAL NERVES     CN III, IV, VI   Pupils are equal, round, and reactive to light.     Significant Labs: All pertinent labs within the past 24 hours have been reviewed.    Significant Imaging: I have reviewed all pertinent imaging results/findings within the past 24 hours.    Assessment/Plan:     Chest pain, cardiac  See above #1      Atrial flutter  Present to hospital left chest wall pain associated with palpation and shortness of breath that woke him up this am.   EKG a fib RVR then converted to NSR diltiazem IV. Nitro placed on chest as well. Troponin 0.046.   CXR similar to last year 1/2022.   Repeat EKG and trend troponin  2 D echo  Cardiology following-start eliquis and add toprol  If echo okay and troponin trend flatdown, then discharge home follow up Dr. Gottlieb    Primary hypertension  Stop taking amlodipine long time ago.   Restart amlodipine . Toprol  .        VTE Risk Mitigation (From admission, onward)         Ordered     apixaban tablet 2.5 mg  2 times daily         02/20/23 1110     enoxaparin injection 70 mg  Once         02/20/23 1048            As clarification on 2/20/2023, patient admit to observation under my care in collaboration with Dr. Sunday Pinedo.      Mine Kelley NP  Department of Hospital Medicine   [facility] - Emergency Dept

## 2023-02-20 NOTE — CONSULTS
[facility] Emergency Dept  Cardiology  Consult Note    Patient Name: Yefri Butler  MRN: [id number]  Admission Date: 2/20/2023  Hospital Length of Stay: 0 days  Code Status: No Order   Attending Provider: Eugenio Koch MD   Consulting Provider: Jose Antonio Gottlieb MD  Primary Care Physician: Primary Doctor No  Principal Problem:<principal problem not specified>    Patient information was obtained from patient and ER records.     Consults  Subjective:     Chief Complaint:  CP, A-flutter     HPI:      Yefri Butler is a 82 y.o. male, with a PMHx of HTN who presents to the ED via EMS with chest pain on the L side that began this morning at 6 AM. Chest pain is exacerbated with palpation. No other exacerbating or alleviating factors. Denies nausea or other associated symptoms. Patient denies smoking.   Per EMS, patient is in new onset A-fib with RVR.     Converted to NSR after IV diltiazem  Currently denies CP or SOB - requesting discharge  EKG A-flutter LVH with repol  Cr 1.9  Troponin 0.04    Poor historian . Denies having PCP or cardiologist  Denies prior atrial flutter      Past Medical History:   Diagnosis Date    Hypertension        History reviewed. No pertinent surgical history.    Review of patient's allergies indicates:  No Known Allergies    No current facility-administered medications on file prior to encounter.     Current Outpatient Medications on File Prior to Encounter   Medication Sig    amLODIPine (NORVASC) 10 MG tablet Take 1 tablet (10 mg total) by mouth once daily.     Family History    None       Tobacco Use    Smoking status: Never    Smokeless tobacco: Never   Substance and Sexual Activity    Alcohol use: Yes     Comment: occasional    Drug use: Never    Sexual activity: Not on file     Review of Systems   Constitutional: Negative for decreased appetite.   HENT:  Negative for ear discharge.    Eyes:  Negative for blurred vision.   Endocrine: Negative for polyphagia.   Skin:   Negative for nail changes.   Genitourinary:  Negative for bladder incontinence.   Neurological:  Negative for aphonia.   Psychiatric/Behavioral:  Negative for hallucinations.    Allergic/Immunologic: Negative for hives.   Objective:     Vital Signs (Most Recent):  Temp: 97.5 °F (36.4 °C) (02/20/23 0904)  Pulse: 92 (02/20/23 1033)  Resp: 16 (02/20/23 1033)  BP: (!) 142/67 (02/20/23 1033)  SpO2: 100 % (02/20/23 1033)   Vital Signs (24h Range):  Temp:  [97.5 °F (36.4 °C)] 97.5 °F (36.4 °C)  Pulse:  [] 92  Resp:  [16-22] 16  SpO2:  [99 %-100 %] 100 %  BP: (142-197)/() 142/67     Weight: 65.8 kg (145 lb)  Body mass index is 23.4 kg/m².    SpO2: 100 %       No intake or output data in the 24 hours ending 02/20/23 1113    Lines/Drains/Airways       Peripheral Intravenous Line  Duration                  Peripheral IV - Single Lumen 02/20/23 0910 20 G Right Antecubital <1 day         Peripheral IV - Single Lumen 02/20/23 18 G Anterior;Left;Proximal Forearm <1 day                    Physical Exam  Constitutional:       Appearance: He is well-developed.   HENT:      Head: Normocephalic and atraumatic.   Eyes:      Conjunctiva/sclera: Conjunctivae normal.      Pupils: Pupils are equal, round, and reactive to light.   Cardiovascular:      Rate and Rhythm: Normal rate.      Pulses: Intact distal pulses.      Heart sounds: Normal heart sounds.   Pulmonary:      Effort: Pulmonary effort is normal.      Breath sounds: Normal breath sounds.   Abdominal:      General: Bowel sounds are normal.      Palpations: Abdomen is soft.   Musculoskeletal:         General: Normal range of motion.      Cervical back: Normal range of motion and neck supple.   Skin:     General: Skin is warm and dry.   Neurological:      Mental Status: He is alert and oriented to person, place, and time.       Significant Labs: All pertinent lab results from the last 24 hours have been reviewed.    Significant Imaging: Echocardiogram: 2D echo with color  flow doppler: No results found for this or any previous visit.    Assessment and Plan:     Chest pain, cardiac  Troponin 0.04. EKG LVH with repol. Suspect demand ischemia from A-flutter RVR. Ok for out patient ischemic evaluation    Atrial flutter  New Dx. Initially with RVR. Converted after IV diltiazem. Check echo - ok for d/c today. Start eliquis 2.5 bid and add toprol XL 50 qd    Primary hypertension  Poorly controlled. Adjust medications        VTE Risk Mitigation (From admission, onward)         Ordered     apixaban tablet 2.5 mg  2 times daily         02/20/23 1110     enoxaparin injection 70 mg  Once         02/20/23 1048                Thank you for your consult. I will sign off. Please contact us if you have any additional questions.    Jose Antonio Gottlieb MD  Cardiology   [facility] - Emergency Dept

## 2023-02-20 NOTE — SUBJECTIVE & OBJECTIVE
Past Medical History:   Diagnosis Date    Hypertension        History reviewed. No pertinent surgical history.    Review of patient's allergies indicates:  No Known Allergies    No current facility-administered medications on file prior to encounter.     Current Outpatient Medications on File Prior to Encounter   Medication Sig    amLODIPine (NORVASC) 10 MG tablet Take 1 tablet (10 mg total) by mouth once daily.     Family History    None       Tobacco Use    Smoking status: Never    Smokeless tobacco: Never   Substance and Sexual Activity    Alcohol use: Yes     Comment: occasional    Drug use: Never    Sexual activity: Not on file     Review of Systems   Constitutional: Negative for decreased appetite.   HENT:  Negative for ear discharge.    Eyes:  Negative for blurred vision.   Endocrine: Negative for polyphagia.   Skin:  Negative for nail changes.   Genitourinary:  Negative for bladder incontinence.   Neurological:  Negative for aphonia.   Psychiatric/Behavioral:  Negative for hallucinations.    Allergic/Immunologic: Negative for hives.   Objective:     Vital Signs (Most Recent):  Temp: 97.5 °F (36.4 °C) (02/20/23 0904)  Pulse: 92 (02/20/23 1033)  Resp: 16 (02/20/23 1033)  BP: (!) 142/67 (02/20/23 1033)  SpO2: 100 % (02/20/23 1033)   Vital Signs (24h Range):  Temp:  [97.5 °F (36.4 °C)] 97.5 °F (36.4 °C)  Pulse:  [] 92  Resp:  [16-22] 16  SpO2:  [99 %-100 %] 100 %  BP: (142-197)/() 142/67     Weight: 65.8 kg (145 lb)  Body mass index is 23.4 kg/m².    SpO2: 100 %       No intake or output data in the 24 hours ending 02/20/23 1113    Lines/Drains/Airways       Peripheral Intravenous Line  Duration                  Peripheral IV - Single Lumen 02/20/23 0910 20 G Right Antecubital <1 day         Peripheral IV - Single Lumen 02/20/23 18 G Anterior;Left;Proximal Forearm <1 day                    Physical Exam  Constitutional:       Appearance: He is well-developed.   HENT:      Head: Normocephalic and  atraumatic.   Eyes:      Conjunctiva/sclera: Conjunctivae normal.      Pupils: Pupils are equal, round, and reactive to light.   Cardiovascular:      Rate and Rhythm: Normal rate.      Pulses: Intact distal pulses.      Heart sounds: Normal heart sounds.   Pulmonary:      Effort: Pulmonary effort is normal.      Breath sounds: Normal breath sounds.   Abdominal:      General: Bowel sounds are normal.      Palpations: Abdomen is soft.   Musculoskeletal:         General: Normal range of motion.      Cervical back: Normal range of motion and neck supple.   Skin:     General: Skin is warm and dry.   Neurological:      Mental Status: He is alert and oriented to person, place, and time.       Significant Labs: All pertinent lab results from the last 24 hours have been reviewed.    Significant Imaging: Echocardiogram: 2D echo with color flow doppler: No results found for this or any previous visit.

## 2023-02-20 NOTE — ASSESSMENT & PLAN NOTE
New Dx. Initially with RVR. Converted after IV diltiazem. Check echo - ok for d/c today. Start eliquis 2.5 bid and add toprol XL 50 qd

## 2023-02-20 NOTE — SUBJECTIVE & OBJECTIVE
Past Medical History:   Diagnosis Date    Hypertension        History reviewed. No pertinent surgical history.    Review of patient's allergies indicates:  No Known Allergies    No current facility-administered medications on file prior to encounter.     Current Outpatient Medications on File Prior to Encounter   Medication Sig    amLODIPine (NORVASC) 10 MG tablet Take 1 tablet (10 mg total) by mouth once daily.     Family History    None       Tobacco Use    Smoking status: Never    Smokeless tobacco: Never   Substance and Sexual Activity    Alcohol use: Yes     Comment: occasional    Drug use: Never    Sexual activity: Not on file     Review of Systems   Constitutional:  Positive for activity change. Negative for appetite change, chills and fatigue.   HENT: Negative.     Eyes: Negative.    Respiratory:  Positive for chest tightness and shortness of breath. Negative for wheezing.    Cardiovascular:  Positive for chest pain and palpitations. Negative for leg swelling.   Gastrointestinal:  Positive for abdominal pain. Negative for blood in stool, constipation, diarrhea, nausea and vomiting.   Endocrine: Negative.    Genitourinary: Negative.    Musculoskeletal:  Positive for arthralgias, back pain and myalgias.   Skin: Negative.    Neurological: Negative.    Hematological: Negative.    Psychiatric/Behavioral: Negative.     Objective:     Vital Signs (Most Recent):  Temp: 97.5 °F (36.4 °C) (02/20/23 0904)  Pulse: 92 (02/20/23 1033)  Resp: 16 (02/20/23 1033)  BP: (!) 142/67 (02/20/23 1033)  SpO2: 100 % (02/20/23 1033)   Vital Signs (24h Range):  Temp:  [97.5 °F (36.4 °C)] 97.5 °F (36.4 °C)  Pulse:  [] 92  Resp:  [16-22] 16  SpO2:  [99 %-100 %] 100 %  BP: (142-197)/() 142/67     Weight: 65.8 kg (145 lb)  Body mass index is 23.4 kg/m².    Physical Exam  Constitutional:       Appearance: Normal appearance. He is not ill-appearing.   HENT:      Head: Normocephalic and atraumatic.   Eyes:      Pupils: Pupils are  equal, round, and reactive to light.   Cardiovascular:      Rate and Rhythm: Normal rate and regular rhythm.   Pulmonary:      Effort: Pulmonary effort is normal.      Breath sounds: Normal breath sounds.   Abdominal:      General: Abdomen is flat. There is no distension.      Palpations: Abdomen is soft.   Musculoskeletal:         General: Normal range of motion.      Cervical back: Normal range of motion.   Skin:     General: Skin is warm and dry.   Neurological:      General: No focal deficit present.      Mental Status: He is alert and oriented to person, place, and time. Mental status is at baseline.      Comments: Wife states very forgetful    Psychiatric:         Mood and Affect: Mood normal.         CRANIAL NERVES     CN III, IV, VI   Pupils are equal, round, and reactive to light.     Significant Labs: All pertinent labs within the past 24 hours have been reviewed.    Significant Imaging: I have reviewed all pertinent imaging results/findings within the past 24 hours.

## 2023-02-20 NOTE — HPI
Yefri Butler is a 82 y.o. male, with a PMHx of HTN who presents to the ED via EMS with chest pain on the L side that began this morning at 6 AM. Chest pain is exacerbated with palpation. No other exacerbating or alleviating factors. Denies nausea or other associated symptoms. Patient denies smoking.   Per EMS, patient is in new onset A-fib with RVR.     Converted to NSR after IV diltiazem  Currently denies CP or SOB - requesting discharge  EKG A-flutter LVH with repol  Cr 1.9  Troponin 0.04    Poor historian . Denies having PCP or cardiologist  Denies prior atrial flutter

## 2023-02-20 NOTE — ED PROVIDER NOTES
Encounter Date: 2/20/2023    SCRIBE #1 NOTE: I, Diya Roa, am scribing for, and in the presence of,  Eugenio Koch MD. I have scribed the following portions of the note - Other sections scribed: HPI and ROS.   SCRIBE #2 NOTE: I, Kathymargie Diaz, am scribing for, and in the presence of,  Eugenio Koch MD.   History     Chief Complaint   Patient presents with    Chest Pain     Pt bib NOEMS c/o chest pain x 1 month worse this AM at 6. New onset of afib with RVR.      Yefri Butler is a 82 y.o. male, with a PMHx of HTN who presents to the ED via EMS with chest pain on the L side that began this morning at 6 AM. Chest pain is exacerbated with palpation. No other exacerbating or alleviating factors. Denies nausea or other associated symptoms. Patient denies smoking.   Per EMS, patient is in new onset A-fib with RVR.     The history is provided by the patient and the EMS personnel. No  was used.   Review of patient's allergies indicates:  No Known Allergies  Past Medical History:   Diagnosis Date    Hypertension      History reviewed. No pertinent surgical history.  History reviewed. No pertinent family history.  Social History     Tobacco Use    Smoking status: Never    Smokeless tobacco: Never   Substance Use Topics    Alcohol use: Yes     Comment: 2/20/23: Beer    Drug use: Never     Review of Systems   Constitutional: Negative.    HENT: Negative.     Eyes: Negative.    Respiratory: Negative.     Cardiovascular:  Positive for chest pain.   Gastrointestinal: Negative.  Negative for nausea and vomiting.   Genitourinary: Negative.    Musculoskeletal: Negative.    Skin: Negative.    Neurological: Negative.      Physical Exam     Initial Vitals [02/20/23 0904]   BP Pulse Resp Temp SpO2   (!) 182/108 (!) 143 (!) 22 97.5 °F (36.4 °C) 99 %      MAP       --         Physical Exam    Nursing note and vitals reviewed.  Constitutional: He appears well-developed. He is not diaphoretic. He appears  distressed (mildly).   HENT:   Head: Normocephalic and atraumatic.   Nose: Nose normal.   Mouth/Throat: No oropharyngeal exudate.   Eyes: EOM are normal. Pupils are equal, round, and reactive to light.   Neck: Neck supple. No tracheal deviation present. No JVD present.   Normal range of motion.  Cardiovascular:  Normal heart sounds and intact distal pulses.           Tachycardic, irregularly irregular rate and rhythm   Pulmonary/Chest: Breath sounds normal. No respiratory distress. He has no wheezes. He has no rhonchi. He has no rales. He exhibits tenderness (left sided chest wall ttp, no overlying skin changes noted).   Abdominal: Abdomen is soft. Bowel sounds are normal. He exhibits no distension. There is no abdominal tenderness. There is no rebound and no guarding.   Musculoskeletal:         General: No tenderness or edema. Normal range of motion.      Cervical back: Normal range of motion and neck supple.     Neurological: He is alert and oriented to person, place, and time. He has normal strength.   Skin: Skin is warm and dry. Capillary refill takes less than 2 seconds. No rash noted. No erythema.       ED Course   Critical Care    Date/Time: 2/20/2023 9:25 AM  Performed by: Eugenio Koch MD  Authorized by: Eugenio Koch MD   Direct patient critical care time: 15 minutes  Additional history critical care time: 5 minutes  Ordering / reviewing critical care time: 5 minutes  Documentation critical care time: 5 minutes  Consulting other physicians critical care time: 5 minutes  Total critical care time (exclusive of procedural time) : 35 minutes  Critical care time was exclusive of separately billable procedures and treating other patients and teaching time.  Critical care was necessary to treat or prevent imminent or life-threatening deterioration of the following conditions: cardiac failure and shock.  Critical care was time spent personally by me on the following activities: development of  treatment plan with patient or surrogate, discussions with consultants, evaluation of patient's response to treatment, examination of patient, obtaining history from patient or surrogate, ordering and performing treatments and interventions, ordering and review of laboratory studies, ordering and review of radiographic studies, re-evaluation of patient's condition and review of old charts.      Labs Reviewed   COMPREHENSIVE METABOLIC PANEL - Abnormal; Notable for the following components:       Result Value    BUN 26 (*)     Creatinine 1.9 (*)     Alkaline Phosphatase 147 (*)     ALT 9 (*)     eGFR 34 (*)     All other components within normal limits   TROPONIN I - Abnormal; Notable for the following components:    Troponin I 0.046 (*)     All other components within normal limits   B-TYPE NATRIURETIC PEPTIDE - Abnormal; Notable for the following components:     (*)     All other components within normal limits   URINALYSIS, REFLEX TO URINE CULTURE - Abnormal; Notable for the following components:    Protein, UA Trace (*)     Ketones, UA 2+ (*)     All other components within normal limits    Narrative:     Specimen Source->Urine   TROPONIN I - Abnormal; Notable for the following components:    Troponin I 0.172 (*)     All other components within normal limits   TROPONIN I - Abnormal; Notable for the following components:    Troponin I 0.319 (*)     All other components within normal limits   LIPID PANEL - Abnormal; Notable for the following components:    Cholesterol 285 (*)     LDL Cholesterol 205.6 (*)     All other components within normal limits   TROPONIN I - Abnormal; Notable for the following components:    Troponin I 0.439 (*)     All other components within normal limits   CBC W/ AUTO DIFFERENTIAL   LIPID PANEL   TSH   TSH   SARS-COV-2 RDRP GENE        ECG Results              EKG 12-lead (Final result)  Result time 02/20/23 16:32:25      Final result by Interface, Lab In [facility] (02/20/23 16:32:25)                    Narrative:    Test Reason : I48.91,    Vent. Rate : 066 BPM     Atrial Rate : 066 BPM     P-R Int : 220 ms          QRS Dur : 078 ms      QT Int : 446 ms       P-R-T Axes : 071 053 186 degrees     QTc Int : 467 ms    Sinus rhythm with 1st degree A-V block  LVH with repolarization abnormality  Abnormal ECG  When compared with ECG of 20-FEB-2023 09:03,  Significant changes have occurred  Confirmed by Jose Antonio Gottlieb MD ([id number]) on 2/20/2023 4:32:14 PM    Referred By: [email or web address]   SELF           Confirmed By:Jose Antonio Gottlieb MD                                     EKG 12-lead (Final result)  Result time 02/20/23 15:57:15      Final result by Interface, Lab In [facility] (02/20/23 15:57:15)                   Narrative:    Test Reason : R07.9,    Vent. Rate : 135 BPM     Atrial Rate : 159 BPM     P-R Int : 000 ms          QRS Dur : 072 ms      QT Int : 324 ms       P-R-T Axes : 000 053 182 degrees     QTc Int : 486 ms    Atrial flutter with rapid ventricular response  Minimal voltage criteria for LVH, may be normal variant  ST and T wave abnormality, consider inferolateral ischemia  Abnormal ECG  When compared with ECG of 20-FEB-2023 09:03,  No significant change was found  Confirmed by Jose Antonio Gottlieb MD ([id number]) on 2/20/2023 3:57:06 PM    Referred By: System System           Confirmed By:Jose Antonio Gottlieb MD                                     EKG 12-lead (Final result)  Result time 02/20/23 16:40:42      Final result by Interface, Lab In [facility] (02/20/23 16:40:42)                   Narrative:    Test Reason : R07.9    Vent. Rate : 146 BPM     Atrial Rate : 111 BPM     P-R Int : 000 ms          QRS Dur : 076 ms      QT Int : 320 ms       P-R-T Axes : 000 055 173 degrees     QTc Int : 498 ms    Program found technically poor ECG  Atrial flutter with rapid ventricular response  Moderate voltage criteria for LVH, may be normal variant  Marked ST abnormality, possible inferior subendocardial injury  Abnormal ECG  When  "compared with ECG of 27-JAN-2022 16:28,  Significant changes have occurred  Confirmed by Jose Antonio Gottlieb MD ([id number]) on 2/20/2023 4:40:37 PM    Referred By: System System           Confirmed By:Jose Antonio Gottlieb MD                                  Imaging Results              X-Ray Chest AP Portable (Final result)  Result time 02/20/23 10:05:44      Final result by Olman Fritz MD (02/20/23 10:05:44)                   Impression:      Coarsened bibasilar interstitial lung markings and questionable trace left pleural effusion.  Otherwise, no acute process identified on this single view.      Electronically signed by: Olman Fritz MD  Date:    02/20/2023  Time:    10:05               Narrative:    EXAMINATION:  XR CHEST AP PORTABLE    CLINICAL HISTORY:  Provided history is "Chest Pain;  ".    TECHNIQUE:  One view of the chest.    COMPARISON:  01/27/2022.    FINDINGS:  Cardiac wires overlie the chest.  Cardiomediastinal silhouette is stable in size, and not significantly enlarged.  Coarsened bibasilar interstitial lung markings but no confluent area of consolidation.  Questionable trace left pleural effusion.  No large pleural effusion.  No distinct pneumothorax.                                       Medications   metoprolol succinate (TOPROL-XL) 24 hr tablet 50 mg (50 mg Oral Given 2/21/23 0818)   amLODIPine tablet 10 mg (10 mg Oral Given 2/21/23 0818)   hydrALAZINE injection 10 mg (10 mg Intravenous Given 2/20/23 1413)   atorvastatin tablet 40 mg (40 mg Oral Given 2/21/23 0818)   enoxaparin injection 70 mg (70 mg Subcutaneous Given 2/21/23 1101)   aspirin EC tablet 81 mg (81 mg Oral Given 2/21/23 2156)   hydrALAZINE tablet 50 mg (50 mg Oral Given 2/21/23 2155)   sodium chloride 0.9% flush 10 mL (has no administration in time range)   nitroGLYCERIN 2% TD oint ointment 1 inch (1 inch Topical (Top) Given 2/20/23 0923)   diltiaZEM injection 16.5 mg (16.5 mg Intravenous Given 2/20/23 0923)   diltiaZEM injection 16.5 mg " (16.5 mg Intravenous Given 2/20/23 1028)   enoxaparin injection 70 mg (70 mg Subcutaneous Given 2/20/23 1233)   potassium chloride SA CR tablet 40 mEq (40 mEq Oral Given 2/21/23 1621)     Medical Decision Making:   History:   Old Medical Records: I decided to obtain old medical records.  Independently Interpreted Test(s):   I have ordered and independently interpreted EKG Reading(s) - see summary below  Clinical Tests:   Lab Tests: Ordered and Reviewed  Radiological Study: Ordered and Reviewed  Medical Tests: Ordered and Reviewed       MDM:    82-year-old male with past medical history as noted above presenting with chest pain.  Initial EKG showed AFib with RVR, rate 146 beats per minute.  Patient's chest pain considerable improved after nitroglycerin was given around.  Patient's chest pain 2/10.  Nitropaste additionally applied, initial diltiazem given with interval improvement in rate however patient's rapid ventricular response returned.  Additional IV push of diltiazem given with patient eventually converting to normal sinus rhythm rates of 60-65 beats per minute.  Patient's chest pain additionally resolved upon reassessment.  Patient given aspirin en route, additional Lovenox given here.  Admitted to observation for continued management and Cardiology consultation.  Pt transferred to the floor in stable and improved condition.       Scribe Attestation:   Scribe #1: I performed the above scribed service and the documentation accurately describes the services I performed. I attest to the accuracy of the note.      ED Course as of 02/21/23 2304   Mon Feb 20, 2023   0919 EKG -903-AFib with RVR nonspecific ST and T-wave abnormalities noted throughout, some slight ST depression noted in V5/V6, no STEMI. [BB]      ED Course User Index  [BB] Eugenio Koch MD               I, Eugenio Koch M.D., personally performed the services described in this documentation.  All medical record entries made by the scribe  were at my direction and in my presence.  I have reviewed the chart and agree that the record reflects my personal performance and is accurate and complete.   Clinical Impression:   Final diagnoses:  [R07.9] Chest pain        ED Disposition Condition    Observation                 Eugenio Koch MD  02/21/23 1832

## 2023-02-20 NOTE — ASSESSMENT & PLAN NOTE
Troponin 0.04. EKG LVH with repol. Suspect demand ischemia from A-flutter RVR. Ok for out patient ischemic evaluation

## 2023-02-21 PROBLEM — N18.31 STAGE 3A CHRONIC KIDNEY DISEASE: Status: ACTIVE | Noted: 2023-02-21

## 2023-02-21 LAB
ALBUMIN SERPL BCP-MCNC: 3.4 G/DL (ref 3.5–5.2)
ALP SERPL-CCNC: 129 U/L (ref 55–135)
ALT SERPL W/O P-5'-P-CCNC: <5 U/L (ref 10–44)
ANION GAP SERPL CALC-SCNC: 10 MMOL/L (ref 8–16)
AST SERPL-CCNC: 17 U/L (ref 10–40)
BILIRUB SERPL-MCNC: 0.6 MG/DL (ref 0.1–1)
BUN SERPL-MCNC: 29 MG/DL (ref 8–23)
CALCIUM SERPL-MCNC: 9.1 MG/DL (ref 8.7–10.5)
CHLORIDE SERPL-SCNC: 106 MMOL/L (ref 95–110)
CO2 SERPL-SCNC: 25 MMOL/L (ref 23–29)
CREAT SERPL-MCNC: 1.9 MG/DL (ref 0.5–1.4)
EST. GFR  (NO RACE VARIABLE): 34 ML/MIN/1.73 M^2
GLUCOSE SERPL-MCNC: 83 MG/DL (ref 70–110)
MAGNESIUM SERPL-MCNC: 2.3 MG/DL (ref 1.6–2.6)
POTASSIUM SERPL-SCNC: 3.3 MMOL/L (ref 3.5–5.1)
PROT SERPL-MCNC: 7.3 G/DL (ref 6–8.4)
SODIUM SERPL-SCNC: 141 MMOL/L (ref 136–145)
TROPONIN I SERPL DL<=0.01 NG/ML-MCNC: 0.36 NG/ML (ref 0–0.03)

## 2023-02-21 PROCEDURE — 63600175 PHARM REV CODE 636 W HCPCS: Performed by: INTERNAL MEDICINE

## 2023-02-21 PROCEDURE — 83735 ASSAY OF MAGNESIUM: CPT | Performed by: NURSE PRACTITIONER

## 2023-02-21 PROCEDURE — 96372 THER/PROPH/DIAG INJ SC/IM: CPT | Performed by: INTERNAL MEDICINE

## 2023-02-21 PROCEDURE — 84484 ASSAY OF TROPONIN QUANT: CPT | Performed by: NURSE PRACTITIONER

## 2023-02-21 PROCEDURE — G0378 HOSPITAL OBSERVATION PER HR: HCPCS

## 2023-02-21 PROCEDURE — 36415 COLL VENOUS BLD VENIPUNCTURE: CPT | Performed by: NURSE PRACTITIONER

## 2023-02-21 PROCEDURE — 25000003 PHARM REV CODE 250: Performed by: NURSE PRACTITIONER

## 2023-02-21 PROCEDURE — 25000003 PHARM REV CODE 250: Performed by: INTERNAL MEDICINE

## 2023-02-21 PROCEDURE — 80053 COMPREHEN METABOLIC PANEL: CPT | Performed by: NURSE PRACTITIONER

## 2023-02-21 RX ORDER — HYDRALAZINE HYDROCHLORIDE 25 MG/1
50 TABLET, FILM COATED ORAL 2 TIMES DAILY
Status: DISCONTINUED | OUTPATIENT
Start: 2023-02-21 | End: 2023-02-22

## 2023-02-21 RX ORDER — ATORVASTATIN CALCIUM 40 MG/1
40 TABLET, FILM COATED ORAL DAILY
Qty: 90 TABLET | Refills: 3 | OUTPATIENT
Start: 2023-02-22 | End: 2024-02-22

## 2023-02-21 RX ORDER — ASPIRIN 81 MG/1
81 TABLET ORAL DAILY
Status: DISCONTINUED | OUTPATIENT
Start: 2023-02-21 | End: 2023-02-22 | Stop reason: HOSPADM

## 2023-02-21 RX ORDER — POTASSIUM CHLORIDE 20 MEQ/1
40 TABLET, EXTENDED RELEASE ORAL ONCE
Status: COMPLETED | OUTPATIENT
Start: 2023-02-21 | End: 2023-02-21

## 2023-02-21 RX ORDER — SODIUM CHLORIDE 0.9 % (FLUSH) 0.9 %
10 SYRINGE (ML) INJECTION
Status: DISCONTINUED | OUTPATIENT
Start: 2023-02-21 | End: 2023-02-22 | Stop reason: HOSPADM

## 2023-02-21 RX ORDER — ENOXAPARIN SODIUM 100 MG/ML
1 INJECTION SUBCUTANEOUS
OUTPATIENT
Start: 2023-02-21

## 2023-02-21 RX ORDER — ENOXAPARIN SODIUM 100 MG/ML
1 INJECTION SUBCUTANEOUS
Status: DISCONTINUED | OUTPATIENT
Start: 2023-02-21 | End: 2023-02-22 | Stop reason: HOSPADM

## 2023-02-21 RX ADMIN — METOPROLOL SUCCINATE 50 MG: 50 TABLET, EXTENDED RELEASE ORAL at 08:02

## 2023-02-21 RX ADMIN — ASPIRIN 81 MG: 81 TABLET, COATED ORAL at 09:02

## 2023-02-21 RX ADMIN — ENOXAPARIN SODIUM 70 MG: 100 INJECTION SUBCUTANEOUS at 11:02

## 2023-02-21 RX ADMIN — APIXABAN 2.5 MG: 2.5 TABLET, FILM COATED ORAL at 08:02

## 2023-02-21 RX ADMIN — HYDRALAZINE HYDROCHLORIDE 50 MG: 25 TABLET, FILM COATED ORAL at 09:02

## 2023-02-21 RX ADMIN — AMLODIPINE BESYLATE 10 MG: 5 TABLET ORAL at 08:02

## 2023-02-21 RX ADMIN — HYDRALAZINE HYDROCHLORIDE 25 MG: 25 TABLET, FILM COATED ORAL at 08:02

## 2023-02-21 RX ADMIN — ATORVASTATIN CALCIUM 40 MG: 40 TABLET, FILM COATED ORAL at 08:02

## 2023-02-21 RX ADMIN — POTASSIUM CHLORIDE 40 MEQ: 1500 TABLET, EXTENDED RELEASE ORAL at 04:02

## 2023-02-21 NOTE — ASSESSMENT & PLAN NOTE
Stop taking amlodipine long time ago.   Restart amlodipine . Toprol. Hydralazine (all new)-titrate

## 2023-02-21 NOTE — HOSPITAL COURSE
Yefri Butler was placed under observation for chest pain, uncontrolled blood pressure (not on antihypertensive) and new onset a fib RVR.     Mr. Butler was given diltiazem IV in ED and converted in NSR. Remained in NSR. Elevated troponin trended up 0.046>0.319>0.439>0.361. . No chest pain or shortness of breath since arrival to the floor. 2 D echo EF 65%, normal diastolic function, mid LAE, and normal wall motion. BP improving with start and titrate in antihypertensives- amlodipine, metoprolol, hydralazine (all new). Continue ASA, statin (both new). Cardiology was consulted and evaluated the patient and recommended a stress test, and that patient is clear to discharge if stress test is negative for ischemia. Stress test showed normal perfusion scan with no evidence of ischemia or infarction. Post Stress EF: 57%. During stress, frequent PACs and PVCs were noted.     All findings and plan were explained to the patient. All questions and concerns were answered. Patient verbalized understanding. Patient is in stable condition to d/c home and has been informed to follow up with his PCP within the next 7-10 days to discuss his observation stay and to follow-up with Cardiology on 03/14/2023, and outpatient renal ultrasound. Patient has been educated to return to the ED if he experiences any further chest pain, shortness of breath, palpitations, lightheadness, weakness, or discomfort.

## 2023-02-21 NOTE — CARE UPDATE
Troponin increased to 0.4. Denies CP. EF 65% on echo. Stop eliquis. Start lovenox. Stress test in AM - ok for d/c if negative for ischemia

## 2023-02-21 NOTE — PROGRESS NOTES
[facility] Medicine  Progress Note    Patient Name: Yefri Butler  MRN: [id number]  Patient Class: OP- Observation   Admission Date: 2/20/2023  Length of Stay: 0 days  Attending Physician: Odin Snow MD  Primary Care Provider: Primary Doctor No        Subjective:     Principal Problem:Atrial flutter        HPI:  Yefri Butler is a 81 yo male with history for hypertension no longer taking amlodipine who presents to hospital for left chest wall discomfort associated with palpitation and shortness of breath that woke patient up this am. Denies headaches, dizziness, change in vision, c diaphoresis, orthopnea, PND, abdominal pain, nausea, vomiting, or extremities weakness/numbness. No new physical limitation in the past month. Prior to event, in usual state of health.  No history of MI or TIA/stroke. Never smoker. EKG a fib RVR then converted to NSR diltiazem IV. Nitro placed on chest as well. Troponin 0.046.   CXR similar to last year 1/2022.       Overview/Hospital Course:  Admission for chest pain, uncontrolled blood pressure (not on antihypertensive) and new onset a fib RVR. Was given diltiazem IV in ED and converted in NSR. Remains in NSR. Elevated troponin trended up 0.046>0.319>0.439>0.361. . No chest pain or shortness of breath since arrival to the floor. 2 D echo EF 65%, normal diastolic function, mid LAE, and normal wall motion. BP improving with start and titrate in antihypertensives- amlodipine, metoprolol, hydralazine (all new). Continue ASA, statin (both new). Hold eliquis switch to lovenox. CKD/Renal function stable. Patient agree to stay for NST in am.       Interval History: overall feels much better. No cp or sob. Wife and Nephew  at bedside.     Review of Systems   Constitutional:  Positive for activity change. Negative for appetite change, chills and fatigue.   HENT: Negative.     Eyes: Negative.    Respiratory:  Negative for chest tightness (resolved)  and wheezing. Shortness of breath: resolved.   Cardiovascular:  Negative for palpitations (resolved) and leg swelling. Chest pain: resolved.  Gastrointestinal:  Negative for abdominal pain (denies currently), blood in stool, constipation, diarrhea, nausea and vomiting.   Endocrine: Negative.    Genitourinary: Negative.    Musculoskeletal:  Positive for arthralgias, back pain and myalgias.   Skin: Negative.    Neurological: Negative.    Hematological: Negative.    Psychiatric/Behavioral: Negative.     Objective:     Vital Signs (Most Recent):  Temp: 98.2 °F (36.8 °C) (02/21/23 1137)  Pulse: 64 (02/21/23 1137)  Resp: 18 (02/21/23 1137)  BP: (!) 160/70 (02/21/23 1137)  SpO2: 99 % (02/21/23 1137)   Vital Signs (24h Range):  Temp:  [97.9 °F (36.6 °C)-98.2 °F (36.8 °C)] 98.2 °F (36.8 °C)  Pulse:  [62-74] 64  Resp:  [13-21] 18  SpO2:  [99 %-100 %] 99 %  BP: (138-178)/(55-88) 160/70     Weight: 68 kg (149 lb 14.6 oz)  Body mass index is 24.2 kg/m².    Intake/Output Summary (Last 24 hours) at 2/21/2023 1329  Last data filed at 2/21/2023 0636  Gross per 24 hour   Intake 120 ml   Output --   Net 120 ml      Physical Exam  Constitutional:       Appearance: Normal appearance. He is not ill-appearing.   Cardiovascular:      Rate and Rhythm: Normal rate and regular rhythm.   Pulmonary:      Effort: Pulmonary effort is normal.      Breath sounds: Normal breath sounds.   Abdominal:      General: Abdomen is flat. There is no distension.      Palpations: Abdomen is soft.   Musculoskeletal:         General: Normal range of motion.      Cervical back: Normal range of motion.   Skin:     General: Skin is warm and dry.   Neurological:      General: No focal deficit present.      Mental Status: He is alert and oriented to person, place, and time. Mental status is at baseline.      Comments: Wife states very forgetful    Psychiatric:         Mood and Affect: Mood normal.       Significant Labs: All pertinent labs within the past 24 hours  have been reviewed.    Significant Imaging: I have reviewed all pertinent imaging results/findings within the past 24 hours.      Assessment/Plan:      * Atrial flutter  Present to hospital left chest wall pain associated with palpation and shortness of breath that woke him up this am.   EKG a fib RVR then converted to NSR diltiazem IV. Nitro placed on chest as well.   Remains in NSR.   Elevated troponin trended up 0.046>0.319>0.439>0.361.   .  No chest pain or shortness of breath since arrival to the floor.   2 D echo EF 65%, normal diastolic function, mid LAE, and normal wall motion.   Continue ASA, statin (both new). Hold eliquis switch to lovenox.   Patient agree to stay for NST in am.     Stage 3a chronic kidney disease  Likely baseline as 1/27/22 sCr 1.7 likely from uncontrolled blood pressure  UA trace protein  Hopefully does not need cath  Add US renal    Chest pain, cardiac  See above #1      Primary hypertension  Stop taking amlodipine long time ago.   Restart amlodipine . Toprol. Hydralazine (all new)-titrate      VTE Risk Mitigation (From admission, onward)         Ordered     enoxaparin injection 70 mg  Every 24 hours (non-standard times)         02/21/23 0914                Discharge Planning   DEION: 2/20/2023     Code Status: Not on file   Is the patient medically ready for discharge?:     Reason for patient still in hospital (select all that apply): Treatment                 Mine Kelley NP  Department of Hospital Medicine   [facility] [place] [facility] Surg

## 2023-02-21 NOTE — NURSING
Pt arrived on the unit accompanied by staff and family. AAO X 2. Resp even and unlabored on room air. [facility] [place]  Nurses Note -- 4 Eyes      2/21/2023       Skin assessed on: Admit      [x] No Pressure Injuries Present    []Prevention Measures Documented    [] Yes LDA  for Pressure Injury Previously documented     [] Yes New Pressure Injury Discovered   [] LDA for New Pressure Injury Added      Attending RN:  Garfield Zuniga, LEON     Second PCT:  Tamar Pastor

## 2023-02-21 NOTE — SUBJECTIVE & OBJECTIVE
Interval History: overall feels much better. No cp or sob. Wife and Nephew  at bedside.     Review of Systems   Constitutional:  Positive for activity change. Negative for appetite change, chills and fatigue.   HENT: Negative.     Eyes: Negative.    Respiratory:  Negative for chest tightness (resolved) and wheezing. Shortness of breath: resolved.   Cardiovascular:  Negative for palpitations (resolved) and leg swelling. Chest pain: resolved.  Gastrointestinal:  Negative for abdominal pain (denies currently), blood in stool, constipation, diarrhea, nausea and vomiting.   Endocrine: Negative.    Genitourinary: Negative.    Musculoskeletal:  Positive for arthralgias, back pain and myalgias.   Skin: Negative.    Neurological: Negative.    Hematological: Negative.    Psychiatric/Behavioral: Negative.     Objective:     Vital Signs (Most Recent):  Temp: 98.2 °F (36.8 °C) (02/21/23 1137)  Pulse: 64 (02/21/23 1137)  Resp: 18 (02/21/23 1137)  BP: (!) 160/70 (02/21/23 1137)  SpO2: 99 % (02/21/23 1137)   Vital Signs (24h Range):  Temp:  [97.9 °F (36.6 °C)-98.2 °F (36.8 °C)] 98.2 °F (36.8 °C)  Pulse:  [62-74] 64  Resp:  [13-21] 18  SpO2:  [99 %-100 %] 99 %  BP: (138-178)/(55-88) 160/70     Weight: 68 kg (149 lb 14.6 oz)  Body mass index is 24.2 kg/m².    Intake/Output Summary (Last 24 hours) at 2/21/2023 1329  Last data filed at 2/21/2023 0636  Gross per 24 hour   Intake 120 ml   Output --   Net 120 ml      Physical Exam  Constitutional:       Appearance: Normal appearance. He is not ill-appearing.   Cardiovascular:      Rate and Rhythm: Normal rate and regular rhythm.   Pulmonary:      Effort: Pulmonary effort is normal.      Breath sounds: Normal breath sounds.   Abdominal:      General: Abdomen is flat. There is no distension.      Palpations: Abdomen is soft.   Musculoskeletal:         General: Normal range of motion.      Cervical back: Normal range of motion.   Skin:     General: Skin is warm and dry.    Neurological:      General: No focal deficit present.      Mental Status: He is alert and oriented to person, place, and time. Mental status is at baseline.      Comments: Wife states very forgetful    Psychiatric:         Mood and Affect: Mood normal.       Significant Labs: All pertinent labs within the past 24 hours have been reviewed.    Significant Imaging: I have reviewed all pertinent imaging results/findings within the past 24 hours.

## 2023-02-21 NOTE — ASSESSMENT & PLAN NOTE
Likely baseline as 1/27/22 sCr 1.7 likely from uncontrolled blood pressure  UA trace protein  Hopefully does not need cath  Add US renal

## 2023-02-21 NOTE — ASSESSMENT & PLAN NOTE
Present to hospital left chest wall pain associated with palpation and shortness of breath that woke him up this am.   EKG a fib RVR then converted to NSR diltiazem IV. Nitro placed on chest as well.   Remains in NSR.   Elevated troponin trended up 0.046>0.319>0.439>0.361.   .  No chest pain or shortness of breath since arrival to the floor.   2 D echo EF 65%, normal diastolic function, mid LAE, and normal wall motion.   Continue ASA, statin (both new). Hold eliquis switch to lovenox.   Patient agree to stay for NST in am.

## 2023-02-21 NOTE — PLAN OF CARE
Remains in bed for most of the shift. Turns independent  Problem: Fall Injury Risk  Goal: Absence of Fall and Fall-Related Injury  Intervention: Identify and Manage Contributors  Flowsheets (Taken 2/21/2023 1352)  Self-Care Promotion:   independence encouraged   BADL personal routines maintained  Medication Review/Management:   medications reviewed   high-risk medications identified     Problem: Fall Injury Risk  Goal: Absence of Fall and Fall-Related Injury  Intervention: Promote Injury-Free Environment  Flowsheets (Taken 2/21/2023 1352)  Safety Promotion/Fall Prevention:   assistive device/personal item within reach   bed alarm set   diversional activities provided   Fall Risk reviewed with patient/family   Fall Risk signage in place   family to remain at bedside   medications reviewed   nonskid shoes/socks when out of bed   side rails raised x 2   supervised activity   Supervised toileting - stay within arms reach   toileting scheduled   instructed to call staff for mobility     Problem: Fatigue  Goal: Improved Activity Tolerance  Intervention: Promote Improved Energy  Flowsheets (Taken 2/21/2023 1352)  Activity Management: Step side-to-side along edge of bed - L3

## 2023-02-21 NOTE — NURSING
Report received and care assumed. Discussed plan of care and safety with patient and wife. Reviewed call system. No acute distress noted .Awaiting troponin level is decreased discharge planned. Denies pain

## 2023-02-22 VITALS
WEIGHT: 149.94 LBS | HEIGHT: 66 IN | HEART RATE: 65 BPM | SYSTOLIC BLOOD PRESSURE: 159 MMHG | BODY MASS INDEX: 24.1 KG/M2 | OXYGEN SATURATION: 98 % | RESPIRATION RATE: 17 BRPM | DIASTOLIC BLOOD PRESSURE: 70 MMHG | TEMPERATURE: 98 F

## 2023-02-22 LAB
ALBUMIN SERPL BCP-MCNC: 3.2 G/DL (ref 3.5–5.2)
ALP SERPL-CCNC: 115 U/L (ref 55–135)
ALT SERPL W/O P-5'-P-CCNC: <5 U/L (ref 10–44)
ANION GAP SERPL CALC-SCNC: 11 MMOL/L (ref 8–16)
AST SERPL-CCNC: 14 U/L (ref 10–40)
BASOPHILS # BLD AUTO: 0.03 K/UL (ref 0–0.2)
BASOPHILS NFR BLD: 0.6 % (ref 0–1.9)
BILIRUB SERPL-MCNC: 0.5 MG/DL (ref 0.1–1)
BUN SERPL-MCNC: 28 MG/DL (ref 8–23)
CALCIUM SERPL-MCNC: 10 MG/DL (ref 8.7–10.5)
CHLORIDE SERPL-SCNC: 108 MMOL/L (ref 95–110)
CO2 SERPL-SCNC: 21 MMOL/L (ref 23–29)
CREAT SERPL-MCNC: 2.1 MG/DL (ref 0.5–1.4)
CV STRESS BASE HR: 67 BPM
DIASTOLIC BLOOD PRESSURE: 80 MMHG
DIFFERENTIAL METHOD: ABNORMAL
EOSINOPHIL # BLD AUTO: 0.1 K/UL (ref 0–0.5)
EOSINOPHIL NFR BLD: 1.6 % (ref 0–8)
ERYTHROCYTE [DISTWIDTH] IN BLOOD BY AUTOMATED COUNT: 13.7 % (ref 11.5–14.5)
EST. GFR  (NO RACE VARIABLE): 30 ML/MIN/1.73 M^2
GLUCOSE SERPL-MCNC: 101 MG/DL (ref 70–110)
HCT VFR BLD AUTO: 41.1 % (ref 40–54)
HGB BLD-MCNC: 13.6 G/DL (ref 14–18)
IMM GRANULOCYTES # BLD AUTO: 0.01 K/UL (ref 0–0.04)
IMM GRANULOCYTES NFR BLD AUTO: 0.2 % (ref 0–0.5)
LYMPHOCYTES # BLD AUTO: 1.8 K/UL (ref 1–4.8)
LYMPHOCYTES NFR BLD: 35 % (ref 18–48)
MAGNESIUM SERPL-MCNC: 2.1 MG/DL (ref 1.6–2.6)
MCH RBC QN AUTO: 29.1 PG (ref 27–31)
MCHC RBC AUTO-ENTMCNC: 33.1 G/DL (ref 32–36)
MCV RBC AUTO: 88 FL (ref 82–98)
MONOCYTES # BLD AUTO: 0.5 K/UL (ref 0.3–1)
MONOCYTES NFR BLD: 9 % (ref 4–15)
NEUTROPHILS # BLD AUTO: 2.8 K/UL (ref 1.8–7.7)
NEUTROPHILS NFR BLD: 53.6 % (ref 38–73)
NRBC BLD-RTO: 0 /100 WBC
NUC STRESS EJECTION FRACTION: 57 %
OHS CV CPX 85 PERCENT MAX PREDICTED HEART RATE MALE: 116
OHS CV CPX MAX PREDICTED HEART RATE: 136
OHS CV CPX PATIENT IS FEMALE: 0
OHS CV CPX PATIENT IS MALE: 1
OHS CV CPX PEAK DIASTOLIC BLOOD PRESSURE: 67 MMHG
OHS CV CPX PEAK HEAR RATE: 93 BPM
OHS CV CPX PEAK RATE PRESSURE PRODUCT: NORMAL
OHS CV CPX PEAK SYSTOLIC BLOOD PRESSURE: 147 MMHG
OHS CV CPX PERCENT MAX PREDICTED HEART RATE ACHIEVED: 68
OHS CV CPX RATE PRESSURE PRODUCT PRESENTING: NORMAL
PLATELET # BLD AUTO: 256 K/UL (ref 150–450)
PMV BLD AUTO: 9.9 FL (ref 9.2–12.9)
POTASSIUM SERPL-SCNC: 3.9 MMOL/L (ref 3.5–5.1)
PROT SERPL-MCNC: 6.8 G/DL (ref 6–8.4)
RBC # BLD AUTO: 4.67 M/UL (ref 4.6–6.2)
SODIUM SERPL-SCNC: 140 MMOL/L (ref 136–145)
SYSTOLIC BLOOD PRESSURE: 164 MMHG
WBC # BLD AUTO: 5.12 K/UL (ref 3.9–12.7)

## 2023-02-22 PROCEDURE — 96372 THER/PROPH/DIAG INJ SC/IM: CPT | Mod: 59 | Performed by: INTERNAL MEDICINE

## 2023-02-22 PROCEDURE — 63600175 PHARM REV CODE 636 W HCPCS: Performed by: INTERNAL MEDICINE

## 2023-02-22 PROCEDURE — 85025 COMPLETE CBC W/AUTO DIFF WBC: CPT | Performed by: NURSE PRACTITIONER

## 2023-02-22 PROCEDURE — 83735 ASSAY OF MAGNESIUM: CPT | Performed by: NURSE PRACTITIONER

## 2023-02-22 PROCEDURE — 25000003 PHARM REV CODE 250: Performed by: NURSE PRACTITIONER

## 2023-02-22 PROCEDURE — G0378 HOSPITAL OBSERVATION PER HR: HCPCS

## 2023-02-22 PROCEDURE — 36415 COLL VENOUS BLD VENIPUNCTURE: CPT | Performed by: NURSE PRACTITIONER

## 2023-02-22 PROCEDURE — 80053 COMPREHEN METABOLIC PANEL: CPT | Performed by: NURSE PRACTITIONER

## 2023-02-22 RX ORDER — ASPIRIN 81 MG/1
81 TABLET ORAL DAILY
Qty: 360 TABLET | Refills: 0 | Status: SHIPPED | OUTPATIENT
Start: 2023-02-23 | End: 2024-02-23

## 2023-02-22 RX ORDER — REGADENOSON 0.08 MG/ML
0.4 INJECTION, SOLUTION INTRAVENOUS ONCE
Status: COMPLETED | OUTPATIENT
Start: 2023-02-22 | End: 2023-02-22

## 2023-02-22 RX ORDER — HYDRALAZINE HYDROCHLORIDE 25 MG/1
25 TABLET, FILM COATED ORAL 2 TIMES DAILY
Status: DISCONTINUED | OUTPATIENT
Start: 2023-02-22 | End: 2023-02-22 | Stop reason: HOSPADM

## 2023-02-22 RX ORDER — ATORVASTATIN CALCIUM 40 MG/1
40 TABLET, FILM COATED ORAL DAILY
Qty: 90 TABLET | Refills: 3 | Status: SHIPPED | OUTPATIENT
Start: 2023-02-23 | End: 2024-02-23

## 2023-02-22 RX ORDER — AMLODIPINE BESYLATE 10 MG/1
10 TABLET ORAL DAILY
Qty: 30 TABLET | Refills: 11 | Status: SHIPPED | OUTPATIENT
Start: 2023-02-23 | End: 2024-02-23

## 2023-02-22 RX ADMIN — ATORVASTATIN CALCIUM 40 MG: 40 TABLET, FILM COATED ORAL at 12:02

## 2023-02-22 RX ADMIN — REGADENOSON 0.4 MG: 0.08 INJECTION, SOLUTION INTRAVENOUS at 09:02

## 2023-02-22 RX ADMIN — METOPROLOL SUCCINATE 50 MG: 50 TABLET, EXTENDED RELEASE ORAL at 12:02

## 2023-02-22 RX ADMIN — ASPIRIN 81 MG: 81 TABLET, COATED ORAL at 12:02

## 2023-02-22 RX ADMIN — ENOXAPARIN SODIUM 70 MG: 100 INJECTION SUBCUTANEOUS at 12:02

## 2023-02-22 RX ADMIN — AMLODIPINE BESYLATE 10 MG: 5 TABLET ORAL at 12:02

## 2023-02-22 NOTE — NURSING
Orders noted for Cardiac diet.  Ok to administer 0900 meds that were held prior to stress test per CIARA Sharp.

## 2023-02-22 NOTE — PLAN OF CARE
Problem: Adult Inpatient Plan of Care  Goal: Plan of Care Review  Outcome: Ongoing, Progressing  Goal: Patient-Specific Goal (Individualized)  Outcome: Ongoing, Progressing  Goal: Absence of Hospital-Acquired Illness or Injury  Outcome: Ongoing, Progressing  Goal: Optimal Comfort and Wellbeing  Outcome: Ongoing, Progressing  Goal: Readiness for Transition of Care  Outcome: Ongoing, Progressing     Problem: Fall Injury Risk  Goal: Absence of Fall and Fall-Related Injury  Outcome: Ongoing, Progressing     Problem: Fatigue  Goal: Improved Activity Tolerance  Outcome: Ongoing, Progressing

## 2023-02-22 NOTE — PLAN OF CARE
Problem: Adult Inpatient Plan of Care  Goal: Plan of Care Review  2/22/2023 0801 by Brittni Parikh RN  Outcome: Ongoing, Progressing  2/22/2023 0800 by Brittni Parikh RN  Outcome: Ongoing, Progressing  Goal: Patient-Specific Goal (Individualized)  2/22/2023 0801 by Brittni Parikh RN  Outcome: Ongoing, Progressing  2/22/2023 0800 by Brittni Parikh RN  Outcome: Ongoing, Progressing  Goal: Absence of Hospital-Acquired Illness or Injury  2/22/2023 0801 by Brittni Parikh RN  Outcome: Ongoing, Progressing  2/22/2023 0800 by Brittni Parikh RN  Outcome: Ongoing, Progressing  Goal: Optimal Comfort and Wellbeing  2/22/2023 0801 by Brittni Parikh RN  Outcome: Ongoing, Progressing  2/22/2023 0800 by Brittni Parikh RN  Outcome: Ongoing, Progressing  Goal: Readiness for Transition of Care  2/22/2023 0801 by Brittni Parikh RN  Outcome: Ongoing, Progressing  2/22/2023 0800 by Brittni Parikh RN  Outcome: Ongoing, Progressing     Problem: Fall Injury Risk  Goal: Absence of Fall and Fall-Related Injury  2/22/2023 0801 by Brittni Parikh RN  Outcome: Ongoing, Progressing  2/22/2023 0800 by Brittni Parikh RN  Outcome: Ongoing, Progressing     Problem: Fatigue  Goal: Improved Activity Tolerance  2/22/2023 0801 by Brittni Parikh RN  Outcome: Ongoing, Progressing  2/22/2023 0800 by Brittni Parikh RN  Outcome: Ongoing, Progressing

## 2023-02-22 NOTE — PLAN OF CARE
[facility] - [facility]  Discharge Assessment    Primary Care Provider: Primary Doctor No     Discharge Assessment (most recent)       BRIEF DISCHARGE ASSESSMENT - 02/22/23 1321          Discharge Planning    Assessment Type Discharge Planning Brief Assessment     Resource/Environmental Concerns none     Support Systems Spouse/significant other     Equipment Currently Used at Home cane, straight;walker, rolling     Current Living Arrangements home     Patient/Family Anticipates Transition to home     Patient/Family Anticipated Services at Transition none     DME Needed Upon Discharge  other (see comments)   TBD    Discharge Plan A Home     Discharge Plan B Home with family;Home Health

## 2023-02-22 NOTE — PLAN OF CARE
Problem: Adult Inpatient Plan of Care  Goal: Plan of Care Review  Outcome: Met  Goal: Patient-Specific Goal (Individualized)  Outcome: Met  Goal: Absence of Hospital-Acquired Illness or Injury  Outcome: Met  Goal: Optimal Comfort and Wellbeing  Outcome: Met  Goal: Readiness for Transition of Care  Outcome: Met     Problem: Fall Injury Risk  Goal: Absence of Fall and Fall-Related Injury  Outcome: Met     Problem: Fatigue  Goal: Improved Activity Tolerance  Outcome: Met

## 2023-02-22 NOTE — NURSING
Report received, care assumed.  Patient recently returned from stress test.  Anxious to discharge home.  Denies chest pain or SOB. Spouse at bedside.  Call bell in reach.  Will continue to monitor.

## 2023-02-22 NOTE — DISCHARGE SUMMARY
[facility]  Discharge Summary      Patient Name: Yefri Butler  MRN: [id number]  KIMBERLY: [id number]  Patient Class: OP- Observation  Admission Date: 2/20/2023  Hospital Length of Stay: 0 days  Discharge Date and Time:  02/22/2023 1:33 PM  Attending Physician: Odin Snow MD   Discharging Provider: Nader Sharp PA-C  Primary Care Provider: Primary Doctor No    Primary Care Team: NADER SHARP    HPI:   Yefri Butler is a 83 yo male with history for hypertension no longer taking amlodipine who presents to hospital for left chest wall discomfort associated with palpitation and shortness of breath that woke patient up this am. Denies headaches, dizziness, change in vision, c diaphoresis, orthopnea, PND, abdominal pain, nausea, vomiting, or extremities weakness/numbness. No new physical limitation in the past month. Prior to event, in usual state of health.  No history of MI or TIA/stroke. Never smoker. EKG a fib RVR then converted to NSR diltiazem IV. Nitro placed on chest as well. Troponin 0.046.   CXR similar to last year 1/2022.       * No surgery found *      Hospital Course:   Yefri Butler was placed under observation for chest pain, uncontrolled blood pressure (not on antihypertensive) and new onset a fib RVR.     Mr. Butler was given diltiazem IV in ED and converted in NSR. Remained in NSR. Elevated troponin trended up 0.046>0.319>0.439>0.361. . No chest pain or shortness of breath since arrival to the floor. 2 D echo EF 65%, normal diastolic function, mid LAE, and normal wall motion. BP improving with start and titrate in antihypertensives- amlodipine, metoprolol, hydralazine (all new). Continue ASA, statin (both new). Cardiology was consulted and evaluated the patient and recommended a stress test, and that patient is clear to discharge if stress test is negative for ischemia. Stress test showed normal perfusion scan with no evidence of ischemia or infarction.  Post Stress EF: 57%. During stress, frequent PACs and PVCs were noted.     All findings and plan were explained to the patient. All questions and concerns were answered. Patient verbalized understanding. Patient is in stable condition to d/c home and has been informed to follow up with his PCP within the next 7-10 days to discuss his observation stay and to follow-up with Cardiology on 03/14/2023, and outpatient renal ultrasound. Patient has been educated to return to the ED if he experiences any further chest pain, shortness of breath, palpitations, lightheadness, weakness, or discomfort.       Goals of Care Treatment Preferences:  Code Status: Full Code      Consults:   Consults (From admission, onward)        Status Ordering Provider     Inpatient consult to Cardiology  Once        Provider:  Jose Antonio Gottlieb MD    Acknowledged [facility] MCCRACKEN          Cardiac/Vascular  * Atrial flutter  Present to hospital left chest wall pain associated with palpation and shortness of breath that woke him up this am.   EKG a fib RVR then converted to NSR diltiazem IV. Nitro placed on chest as well.   Remains in NSR.   Elevated troponin trended up 0.046>0.319>0.439>0.361.   .  No chest pain or shortness of breath since arrival to the floor.   2 D echo EF 65%, normal diastolic function, mid LAE, and normal wall motion.   Continue ASA, statin (both new). Hold eliquis switch to lovenox.   Patient agree to stay for NST in am.     Chest pain, cardiac  See above #1      Primary hypertension  Stop taking amlodipine long time ago.   Restart amlodipine . Toprol. Hydralazine (all new)-titrate      Renal/  Stage 3a chronic kidney disease  Likely baseline as 1/27/22 sCr 1.7 likely from uncontrolled blood pressure  UA trace protein  Hopefully does not need cath  Add US renal      Final Active Diagnoses:    Diagnosis Date Noted POA    PRINCIPAL PROBLEM:  Atrial flutter [I48.92] 02/20/2023 Yes    Stage 3a chronic kidney disease  [N18.31] 02/21/2023 Yes    Primary hypertension [I10] 02/20/2023 Yes    Chest pain, cardiac [R07.9] 02/20/2023 Yes      Problems Resolved During this Admission:       Discharged Condition: stable    Disposition: Home or Self Care    Follow Up:   Follow-up Information     [facility] up.    Why: Please call [phone] to schedule a hospital follow up  Contact information:  [address] [place] [address]  [phone]                       Patient Instructions:      US Retroperitoneal Complete   Standing Status: Future Standing Exp. Date: 02/22/24     Order Specific Question Answer Comments   May the Radiologist modify the order per protocol to meet the clinical needs of the patient? Yes    Release to patient Immediate      Diet Cardiac     Notify your health care provider if you experience any of the following:  temperature >100.4     Notify your health care provider if you experience any of the following:  difficulty breathing or increased cough     Notify your health care provider if you experience any of the following:  increased confusion or weakness     Notify your health care provider if you experience any of the following:  persistent dizziness, light-headedness, or visual disturbances     Notify your health care provider if you experience any of the following:  severe uncontrolled pain     Activity as tolerated       Significant Diagnostic Studies: Labs:   BMP:   Recent Labs   Lab 02/21/23  0704 02/22/23 0447   GLU 83 101    140   K 3.3* 3.9    108   CO2 25 21*   BUN 29* 28*   CREATININE 1.9* 2.1*   CALCIUM 9.1 10.0   MG 2.3 2.1   , CMP   Recent Labs   Lab 02/21/23  0704 02/22/23 0447    140   K 3.3* 3.9    108   CO2 25 21*   GLU 83 101   BUN 29* 28*   CREATININE 1.9* 2.1*   CALCIUM 9.1 10.0   PROT 7.3 6.8   ALBUMIN 3.4* 3.2*   BILITOT 0.6 0.5   ALKPHOS 129 115   AST 17 14   ALT <5* <5*   ANIONGAP 10 11   , CBC   Recent Labs   Lab 02/22/23 0447   WBC  5.12   HGB 13.6*   HCT 41.1        Lipid Panel   Lab Results   Component Value Date    CHOL 285 (H) 02/20/2023    HDL 61 02/20/2023    LDLCALC 205.6 (H) 02/20/2023    TRIG 92 02/20/2023    CHOLHDL 21.4 02/20/2023     Troponin   Recent Labs   Lab 02/20/23  1627 02/20/23  2214 02/21/23  0704   TROPONINI 0.319* 0.439* 0.361*     Transthoracic echo (TTE) complete (Cupid Only):   Results for orders placed or performed during the hospital encounter of 02/20/23   Echo   Result Value Ref Range    BSA 1.75 m2    TDI SEPTAL 0.04 m/s    LV LATERAL E/E' RATIO 9.29 m/s    LV SEPTAL E/E' RATIO 16.25 m/s    IVC diameter 11 cm    Left Ventricular Outflow Tract Mean Velocity 0.70 cm/s    Left Ventricular Outflow Tract Mean Gradient 2.10 mmHg    AORTIC VALVE CUSP SEPERATION 1.96 cm    TDI LATERAL 0.07 m/s    PV PEAK VELOCITY 0.92 cm/s    LVIDd 4.43 3.5 - 6.0 cm    IVS 1.56 (A) 0.6 - 1.1 cm    Posterior Wall 1.11 (A) 0.6 - 1.1 cm    Ao root annulus 3.43 cm    LVIDs 2.89 2.1 - 4.0 cm    FS 35 28 - 44 %    Sinus 3.47 cm    STJ 2.89 cm    Ascending aorta 3.09 cm    LV mass 226.05 g    LA size 3.35 cm    RVDD 3.21 cm    TAPSE 1.78 cm    Left Ventricle Relative Wall Thickness 0.50 cm    AV regurgitation pressure 1/2 time [id number] ms    AV mean gradient 2 mmHg    AV valve area 4.23 cm2    AV Velocity Ratio 1.02     AV index (prosthetic) 1.38     MV valve area p 1/2 method 4.18 cm2    E/A ratio 1.27     Mean e' 0.06 m/s    E wave deceleration time 242.96 msec    LVOT diameter 1.98 cm    LVOT area 3.1 cm2    LVOT peak raman 0.93 m/s    LVOT peak VTI 20.90 cm    Ao peak raman 0.91 m/s    Ao VTI 15.2 cm    LVOT stroke volume 64.32 cm3    AV peak gradient 3 mmHg    E/E' ratio 11.82 m/s    MV Peak E Raman 0.65 m/s    AR Max Raman 3.14 m/s    TR Max Raman 1.39 m/s    MV stenosis pressure 1/2 time 52.62 ms    MV Peak A Raman 0.51 m/s    LV Systolic Volume 31.97 mL    LV Systolic Volume Index 18.4 mL/m2    LV Diastolic Volume 89.28 mL    LV  Diastolic Volume Index 51.31 mL/m2    LV Mass Index 130 g/m2    RA Major Axis 5.28 cm    Left Atrium Minor Axis 6.09 cm    Left Atrium Major Axis 4.43 cm    Triscuspid Valve Regurgitation Peak Gradient 8 mmHg    Right Atrial Pressure (from IVC) 3 mmHg    EF 65 %    TV rest pulmonary artery pressure 11 mmHg    Narrative    · The left ventricle is normal in size with concentric hypertrophy and   normal systolic function.  · The estimated ejection fraction is 65%.  · Normal left ventricular diastolic function.  · Normal right ventricular size with normal right ventricular systolic   function.  · Mild left atrial enlargement.  · Normal central venous pressure (3 mmHg).  · The estimated PA systolic pressure is 11 mmHg.        Results for orders placed during the hospital encounter of 02/20/23    Nuclear Stress - Cardiology Interpreted    Interpretation Summary    Normal myocardial perfusion scan. There is no evidence of myocardial ischemia or infarction.    There is a  mild intensity fixed perfusion abnormality in the inferolateral wall of the left ventricle secondary to diaphragm attenuation.    The gated perfusion images showed an ejection fraction of 57% post stress.    The ECG portion of the study is negative for ischemia.    The patient reported no chest pain during the stress test.    During stress, frequent PACs are noted. , During stress, frequent PVCs are noted.    Pending Diagnostic Studies:     Procedure Component Value Units Date/Time    US Retroperitoneal Complete [[id number]]     Order Status: Sent Lab Status: No result          Medications:  Reconciled Home Medications:      Medication List      START taking these medications    apixaban 2.5 mg Tab  Commonly known as: ELIQUIS  Take 1 tablet (2.5 mg total) by mouth 2 (two) times daily.     aspirin 81 MG EC tablet  Commonly known as: ECOTRIN  Take 1 tablet (81 mg total) by mouth once daily.  Start taking on: February 23, 2023     atorvastatin 40 MG  tablet  Commonly known as: LIPITOR  Take 1 tablet (40 mg total) by mouth once daily.  Start taking on: February 23, 2023     metoprolol succinate 50 MG 24 hr tablet  Commonly known as: TOPROL-XL  Take 1 tablet (50 mg total) by mouth once daily.        CONTINUE taking these medications    amLODIPine 10 MG tablet  Commonly known as: NORVASC  Take 1 tablet (10 mg total) by mouth once daily.  Start taking on: February 23, 2023            Indwelling Lines/Drains at time of discharge:   Lines/Drains/Airways     None                 Time spent on the discharge of patient: 55 minutes      Nader Sharp PA-C  Department of Hospital Medicine  [facility]  02/22/2023

## 2023-02-22 NOTE — PLAN OF CARE
[facility] - Med Surg  Discharge Final Note    Patient from home and lives with wife, who will be taking him home. Follow up appointments for pcp and cardiology scheduled and listed on avs.     Primary Care Provider: Primary Doctor No    Expected Discharge Date: 2/22/2023    Final Discharge Note (most recent)       Final Note - 02/22/23 1352          Final Note    Assessment Type Final Discharge Note     Anticipated Discharge Disposition Home or Self Care     What phone number can be called within the next 1-3 days to see how you are doing after discharge? [phone]     Hospital Resources/Appts/Education Provided Provided patient/caregiver with written discharge plan information;Appointments scheduled and added to AVS        Post-Acute Status    Discharge Delays None known at this time                     Important Message from Medicare